# Patient Record
Sex: MALE | Race: WHITE | NOT HISPANIC OR LATINO | Employment: OTHER | URBAN - METROPOLITAN AREA
[De-identification: names, ages, dates, MRNs, and addresses within clinical notes are randomized per-mention and may not be internally consistent; named-entity substitution may affect disease eponyms.]

---

## 2022-12-13 ENCOUNTER — OFFICE VISIT (OUTPATIENT)
Dept: PRIMARY CARE CLINIC | Facility: CLINIC | Age: 72
End: 2022-12-13
Payer: MEDICARE

## 2022-12-13 ENCOUNTER — PATIENT OUTREACH (OUTPATIENT)
Dept: ADMINISTRATIVE | Facility: HOSPITAL | Age: 72
End: 2022-12-13
Payer: OTHER GOVERNMENT

## 2022-12-13 VITALS
RESPIRATION RATE: 18 BRPM | SYSTOLIC BLOOD PRESSURE: 122 MMHG | OXYGEN SATURATION: 96 % | HEART RATE: 82 BPM | WEIGHT: 197 LBS | BODY MASS INDEX: 32.82 KG/M2 | HEIGHT: 65 IN | DIASTOLIC BLOOD PRESSURE: 80 MMHG | TEMPERATURE: 98 F

## 2022-12-13 DIAGNOSIS — Z86.010 HISTORY OF COLON POLYPS: ICD-10-CM

## 2022-12-13 DIAGNOSIS — R73.03 PREDIABETES: ICD-10-CM

## 2022-12-13 DIAGNOSIS — Z12.5 PROSTATE CANCER SCREENING: ICD-10-CM

## 2022-12-13 DIAGNOSIS — I10 PRIMARY HYPERTENSION: Primary | ICD-10-CM

## 2022-12-13 DIAGNOSIS — I25.10 ATHEROSCLEROSIS OF NATIVE CORONARY ARTERY OF NATIVE HEART WITHOUT ANGINA PECTORIS: ICD-10-CM

## 2022-12-13 DIAGNOSIS — E78.5 HYPERLIPIDEMIA, UNSPECIFIED HYPERLIPIDEMIA TYPE: ICD-10-CM

## 2022-12-13 DIAGNOSIS — Z13.6 ENCOUNTER FOR ABDOMINAL AORTIC ANEURYSM (AAA) SCREENING: ICD-10-CM

## 2022-12-13 DIAGNOSIS — Z11.59 NEED FOR HEPATITIS C SCREENING TEST: ICD-10-CM

## 2022-12-13 PROBLEM — H04.123 DRY EYE SYNDROME OF BILATERAL LACRIMAL GLANDS: Status: ACTIVE | Noted: 2022-12-13

## 2022-12-13 PROBLEM — I25.2 OLD MYOCARDIAL INFARCTION: Status: ACTIVE | Noted: 2022-12-13

## 2022-12-13 PROBLEM — M54.12 RADICULOPATHY, CERVICAL REGION: Status: ACTIVE | Noted: 2022-12-13

## 2022-12-13 PROBLEM — Z86.0100 HISTORY OF COLON POLYPS: Status: ACTIVE | Noted: 2022-12-13

## 2022-12-13 PROCEDURE — 99999 PR PBB SHADOW E&M-NEW PATIENT-LVL IV: ICD-10-PCS | Mod: PBBFAC,,, | Performed by: FAMILY MEDICINE

## 2022-12-13 PROCEDURE — 99999 PR PBB SHADOW E&M-NEW PATIENT-LVL IV: CPT | Mod: PBBFAC,,, | Performed by: FAMILY MEDICINE

## 2022-12-13 PROCEDURE — 99204 PR OFFICE/OUTPT VISIT, NEW, LEVL IV, 45-59 MIN: ICD-10-PCS | Mod: S$GLB,,, | Performed by: FAMILY MEDICINE

## 2022-12-13 PROCEDURE — 99204 OFFICE O/P NEW MOD 45 MIN: CPT | Mod: S$GLB,,, | Performed by: FAMILY MEDICINE

## 2022-12-13 PROCEDURE — 99204 OFFICE O/P NEW MOD 45 MIN: CPT | Mod: PBBFAC,PN | Performed by: FAMILY MEDICINE

## 2022-12-13 RX ORDER — CHOLECALCIFEROL (VITAMIN D3) 50 MCG
TABLET ORAL
COMMUNITY
Start: 2022-09-14

## 2022-12-13 RX ORDER — ROSUVASTATIN CALCIUM 40 MG/1
10 TABLET, COATED ORAL NIGHTLY
Status: ON HOLD | COMMUNITY
End: 2023-02-28 | Stop reason: HOSPADM

## 2022-12-13 RX ORDER — METOPROLOL SUCCINATE 200 MG/1
0.5 TABLET, EXTENDED RELEASE ORAL DAILY
Status: ON HOLD | COMMUNITY
Start: 2022-03-15 | End: 2023-02-28 | Stop reason: HOSPADM

## 2022-12-13 RX ORDER — CARBOXYMETHYLCELLULOSE SODIUM 5 MG/ML
SOLUTION/ DROPS OPHTHALMIC
COMMUNITY
Start: 2022-09-15

## 2022-12-13 RX ORDER — MULTIVIT WITH MINERALS/HERBS
1 TABLET ORAL DAILY
COMMUNITY
Start: 2022-09-15

## 2022-12-13 NOTE — PROGRESS NOTES
Health Maintenance Due   Topic Date Due    Hepatitis C Screening  Never done    Hemoglobin A1c (Prediabetes)  Never done    Colorectal Cancer Screening  Never done    Abdominal Aortic Aneurysm Screening  Never done     Chart review done. HM updated. Immunizations reviewed & updated. Care Everywhere updated.

## 2022-12-13 NOTE — PROGRESS NOTES
"Subjective:       Patient ID: Sammy Kam is a 72 y.o. male.    Chief Complaint: Establish Care    Here to establish care. [facility] [place] [facility] in [place]. Hx of MI in 2016, 1 stent placed, no recent angina. Had also been going to [facility], switching primary care services to here. Scheduled for repeat C-scope next year    Review of Systems   Constitutional:  Negative for chills, fatigue and fever.   HENT:  Negative for congestion.    Eyes:  Negative for visual disturbance.   Respiratory:  Negative for cough and shortness of breath.    Cardiovascular:  Negative for chest pain.   Gastrointestinal:  Negative for abdominal pain, blood in stool, nausea and vomiting.   Genitourinary:  Negative for difficulty urinating.   Musculoskeletal:  Positive for neck pain.   Skin:  Negative for rash.   Neurological:  Negative for dizziness and weakness.   Hematological:  Does not bruise/bleed easily.   Psychiatric/Behavioral:  Negative for sleep disturbance.      Objective:      Vitals:    12/13/22 0833   BP: 122/80   BP Location: Right arm   Patient Position: Sitting   BP Method: Medium (Manual)   Pulse: 82   Resp: 18   Temp: 97.5 °F (36.4 °C)   TempSrc: Temporal   SpO2: 96%   Weight: 89.4 kg (196 lb 15.7 oz)   Height: 5' 5" (1.651 m)     Physical Exam  Vitals and nursing note reviewed.   Constitutional:       General: He is not in acute distress.     Appearance: Normal appearance. He is well-developed.   HENT:      Head: Normocephalic and atraumatic.   Neck:      Vascular: No carotid bruit.   Cardiovascular:      Rate and Rhythm: Normal rate and regular rhythm.      Heart sounds: Normal heart sounds.   Pulmonary:      Effort: Pulmonary effort is normal.      Breath sounds: Normal breath sounds.   Abdominal:      General: There is no distension.      Tenderness: There is no abdominal tenderness.   Musculoskeletal:      Right lower leg: No edema.      Left lower leg: No edema.   Skin:     General: Skin is warm " and dry.   Neurological:      Mental Status: He is alert and oriented to person, place, and time.   Psychiatric:         Mood and Affect: Mood normal.         Behavior: Behavior normal.       No results found for: WBC, HGB, HCT, PLT, CHOL, TRIG, HDL, LDLDIRECT, ALT, AST, NA, K, CL, CREATININE, BUN, CO2, TSH, PSA, INR, GLUF, HGBA1C, MICROALBUR   Assessment:       1. Primary hypertension    2. Hyperlipidemia, unspecified hyperlipidemia type    3. Prediabetes    4. Atherosclerosis of native coronary artery of native heart without angina pectoris    5. Encounter for abdominal aortic aneurysm (AAA) screening    6. History of colon polyps    7. Need for hepatitis C screening test    8. Prostate cancer screening          Plan:       Primary hypertension  -     CBC Auto Differential; Future; Expected date: 12/13/2022  Well controlled  Hyperlipidemia, unspecified hyperlipidemia type  -     Comprehensive Metabolic Panel; Future; Expected date: 12/13/2022  -     Lipid Panel; Future; Expected date: 12/13/2022  Check labs, adjust meds if necessary  Prediabetes  -     Hemoglobin A1C; Future; Expected date: 12/13/2022  Low carb diet  Atherosclerosis of native coronary artery of native heart without angina pectoris  Continue low-dose aspirin and beta-blocker and statin  Encounter for abdominal aortic aneurysm (AAA) screening  [facility]  History of colon polyps  S[facility]  Need for hepatitis C screening test  -     Hepatitis C Antibody; Future; Expected date: 12/13/2022    Prostate cancer screening  -     PSA, Screening; Future; Expected date: 12/13/2022      Medication List with Changes/Refills   Current Medications    ARTIFICIAL TEARS,HYPROMELLOSE,,GENTEAL/SUSTANE, 0.3 % GEL    APPLY TO EACH EYE AT BEDTIME    ASPIRIN (ECOTRIN) 81 MG EC TABLET    Take 81 mg by mouth once daily.      ASPIRIN-ACETAMINOPHEN-CAFFEINE 250-250-65 MG (EXCEDRIN MIGRAINE) 250-250-65 MG PER TABLET     Take 1 tablet by mouth 2 (two) times a day.    B COMPLEX VITAMINS TABLET    Take 1 tablet by mouth once daily.    CARBOXYMETHYLCELLULOSE (REFRESH PLUS) 0.5 % DPET    INSTILL 2 DROP(S) IN EACH EYE SIX TIMES DAILY AS NEEDED FOR DRY EYES    CHOLECALCIFEROL, VITAMIN D3, (VITAMIN D3) 50 MCG (2,000 UNIT) TAB    TAKE ONE TABLET BY MOUTH EVERY DAY AS A VITAMIN SUPPLEMENT    KETOCONAZOLE (NIZORAL) 2 % SHAMPOO    Apply topically twice a week.      METOPROLOL SUCCINATE (TOPROL-XL) 200 MG 24 HR TABLET    Take 0.5 tablets by mouth once daily.    ROSUVASTATIN (CRESTOR) 40 MG TAB    Take 10 mg by mouth every evening.    TERBINAFINE HCL (LAMISIL) 1 % CREAM    APPLY SMALL AMOUNT TOPICALLY TWICE A DAY FOR FUNGAL INFECTION APPLY TO FEET/TOES FOR FUNGAL INFECTION   Discontinued Medications    ATORVASTATIN (LIPITOR) 40 MG TABLET    Take 40 mg by mouth once daily.    CLOPIDOGREL (PLAVIX) 300 MG TAB    Take 300 mg by mouth once.    CLOTRIMAZOLE (LOTRIMIN) 1 % SOLN    Apply topically 2 (two) times daily.      HYDROCORTISONE 2.5 % CREAM    Apply topically 2 (two) times daily.      METOPROLOL SUCCINATE (TOPROL-XL) 25 MG 24 HR TABLET    Take 25 mg by mouth once daily.    NITROGLYCERIN (NITROSTAT) 0.3 MG SL TABLET    Place 0.3 mg under the tongue every 5 (five) minutes as needed for Chest pain.    SIMVASTATIN (ZOCOR) 5 MG TABLET    Take 5 mg by mouth every evening.

## 2022-12-13 NOTE — LETTER
AUTHORIZATION FOR RELEASE OF   CONFIDENTIAL INFORMATION    Dear [facility],    We are seeing Sammy Kam, date of birth 1950, in the clinic at [facility]. Bret Rice MD is the patient's PCP. Sammy Kam has an outstanding lab/procedure at the time we reviewed his chart. In order to help keep his health information updated, he has authorized us to request the following medical record(s):        (  )  MAMMOGRAM                                      ( X )  COLONOSCOPY      (  )  PAP SMEAR                                          (  )  OUTSIDE LAB RESULTS     (  )  DEXA SCAN                                          (  )  EYE EXAM            (  )  FOOT EXAM                                          (  )  ENTIRE RECORD     (  )  OUTSIDE IMMUNIZATIONS                 ( X )  ABD ULTRA SOUND         Please fax records to [facility], Ryan M Truxillo, MD, [phone]      Patient Name: Sammy Kam  : 1950  Patient Phone #: [phone]

## 2023-01-18 ENCOUNTER — PATIENT OUTREACH (OUTPATIENT)
Dept: ADMINISTRATIVE | Facility: HOSPITAL | Age: 73
End: 2023-01-18
Payer: MEDICARE

## 2023-01-18 NOTE — PROGRESS NOTES
Health Maintenance Due   Topic Date Due    Colorectal Cancer Screening  Never done    Abdominal Aortic Aneurysm Screening  Never done        Chart review done.   HM updated.   Immunizations reviewed & updated.   Care Everywhere updated.  Orders Entered:  NA

## 2023-02-01 ENCOUNTER — OFFICE VISIT (OUTPATIENT)
Dept: PRIMARY CARE CLINIC | Facility: CLINIC | Age: 73
End: 2023-02-01
Payer: MEDICARE

## 2023-02-01 VITALS
HEIGHT: 66 IN | WEIGHT: 194.69 LBS | SYSTOLIC BLOOD PRESSURE: 130 MMHG | RESPIRATION RATE: 18 BRPM | DIASTOLIC BLOOD PRESSURE: 70 MMHG | OXYGEN SATURATION: 97 % | TEMPERATURE: 98 F | BODY MASS INDEX: 31.29 KG/M2 | HEART RATE: 79 BPM

## 2023-02-01 DIAGNOSIS — R10.9 RIGHT FLANK PAIN: Primary | ICD-10-CM

## 2023-02-01 LAB
BILIRUB SERPL-MCNC: NORMAL MG/DL
BLOOD URINE, POC: NORMAL
COLOR, POC UA: YELLOW
GLUCOSE UR QL STRIP: NORMAL
KETONES UR QL STRIP: NORMAL
LEUKOCYTE ESTERASE URINE, POC: NORMAL
NITRITE, POC UA: NORMAL
PH, POC UA: 6
PROTEIN, POC: NORMAL
SPECIFIC GRAVITY, POC UA: 1.01
UROBILINOGEN, POC UA: NORMAL

## 2023-02-01 PROCEDURE — 81001 POCT URINALYSIS, DIPSTICK OR TABLET REAGENT, AUTOMATED, WITH MICROSCOP: ICD-10-PCS | Mod: S$GLB,,, | Performed by: FAMILY MEDICINE

## 2023-02-01 PROCEDURE — 3288F PR FALLS RISK ASSESSMENT DOCUMENTED: ICD-10-PCS | Mod: CPTII,S$GLB,, | Performed by: FAMILY MEDICINE

## 2023-02-01 PROCEDURE — 3078F DIAST BP <80 MM HG: CPT | Mod: CPTII,S$GLB,, | Performed by: FAMILY MEDICINE

## 2023-02-01 PROCEDURE — 1101F PT FALLS ASSESS-DOCD LE1/YR: CPT | Mod: CPTII,S$GLB,, | Performed by: FAMILY MEDICINE

## 2023-02-01 PROCEDURE — 3078F PR MOST RECENT DIASTOLIC BLOOD PRESSURE < 80 MM HG: ICD-10-PCS | Mod: CPTII,S$GLB,, | Performed by: FAMILY MEDICINE

## 2023-02-01 PROCEDURE — 1126F AMNT PAIN NOTED NONE PRSNT: CPT | Mod: CPTII,S$GLB,, | Performed by: FAMILY MEDICINE

## 2023-02-01 PROCEDURE — 99213 OFFICE O/P EST LOW 20 MIN: CPT | Mod: S$GLB,,, | Performed by: FAMILY MEDICINE

## 2023-02-01 PROCEDURE — 3008F PR BODY MASS INDEX (BMI) DOCUMENTED: ICD-10-PCS | Mod: CPTII,S$GLB,, | Performed by: FAMILY MEDICINE

## 2023-02-01 PROCEDURE — 81001 URINALYSIS AUTO W/SCOPE: CPT | Mod: S$GLB,,, | Performed by: FAMILY MEDICINE

## 2023-02-01 PROCEDURE — 1101F PR PT FALLS ASSESS DOC 0-1 FALLS W/OUT INJ PAST YR: ICD-10-PCS | Mod: CPTII,S$GLB,, | Performed by: FAMILY MEDICINE

## 2023-02-01 PROCEDURE — 99999 PR PBB SHADOW E&M-EST. PATIENT-LVL IV: ICD-10-PCS | Mod: PBBFAC,,, | Performed by: FAMILY MEDICINE

## 2023-02-01 PROCEDURE — 3075F PR MOST RECENT SYSTOLIC BLOOD PRESS GE 130-139MM HG: ICD-10-PCS | Mod: CPTII,S$GLB,, | Performed by: FAMILY MEDICINE

## 2023-02-01 PROCEDURE — 3075F SYST BP GE 130 - 139MM HG: CPT | Mod: CPTII,S$GLB,, | Performed by: FAMILY MEDICINE

## 2023-02-01 PROCEDURE — 1159F MED LIST DOCD IN RCRD: CPT | Mod: CPTII,S$GLB,, | Performed by: FAMILY MEDICINE

## 2023-02-01 PROCEDURE — 3008F BODY MASS INDEX DOCD: CPT | Mod: CPTII,S$GLB,, | Performed by: FAMILY MEDICINE

## 2023-02-01 PROCEDURE — 99213 PR OFFICE/OUTPT VISIT, EST, LEVL III, 20-29 MIN: ICD-10-PCS | Mod: S$GLB,,, | Performed by: FAMILY MEDICINE

## 2023-02-01 PROCEDURE — 1160F RVW MEDS BY RX/DR IN RCRD: CPT | Mod: CPTII,S$GLB,, | Performed by: FAMILY MEDICINE

## 2023-02-01 PROCEDURE — 1159F PR MEDICATION LIST DOCUMENTED IN MEDICAL RECORD: ICD-10-PCS | Mod: CPTII,S$GLB,, | Performed by: FAMILY MEDICINE

## 2023-02-01 PROCEDURE — 99999 PR PBB SHADOW E&M-EST. PATIENT-LVL IV: CPT | Mod: PBBFAC,,, | Performed by: FAMILY MEDICINE

## 2023-02-01 PROCEDURE — 1160F PR REVIEW ALL MEDS BY PRESCRIBER/CLIN PHARMACIST DOCUMENTED: ICD-10-PCS | Mod: CPTII,S$GLB,, | Performed by: FAMILY MEDICINE

## 2023-02-01 PROCEDURE — 1126F PR PAIN SEVERITY QUANTIFIED, NO PAIN PRESENT: ICD-10-PCS | Mod: CPTII,S$GLB,, | Performed by: FAMILY MEDICINE

## 2023-02-01 PROCEDURE — 3288F FALL RISK ASSESSMENT DOCD: CPT | Mod: CPTII,S$GLB,, | Performed by: FAMILY MEDICINE

## 2023-02-01 RX ORDER — CLOPIDOGREL BISULFATE 75 MG/1
75 TABLET ORAL
COMMUNITY
Start: 2023-01-01 | End: 2023-07-24 | Stop reason: SDUPTHER

## 2023-02-01 NOTE — PROGRESS NOTES
"Subjective:       Patient ID: Sammy Kam is a 72 y.o. male.    Chief Complaint: Follow-up (Pt in for ER follow-up. Pt also c/o right flank pain for 2 weeks) and Flank Pain    Went to ER with influenza in early January, feeling better. No residual fever or SoB.  Also has been having intermittent R flank pain past few weeks, seems to be worse with walking, better with rest. No urinary symptoms. No nausea or vomiting. No rash.    Flank Pain  This is a new problem. The current episode started 1 to 4 weeks ago. The problem occurs intermittently. The problem has been waxing and waning since onset. The pain does not radiate. The pain is mild. The symptoms are aggravated by twisting. Pertinent negatives include no abdominal pain, bladder incontinence, bowel incontinence, chest pain, dysuria, fever, numbness or weakness.   Review of Systems   Constitutional:  Negative for fever.   Cardiovascular:  Negative for chest pain.   Gastrointestinal:  Negative for abdominal pain and bowel incontinence.   Genitourinary:  Positive for flank pain. Negative for bladder incontinence and dysuria.   Neurological:  Negative for weakness and numbness.     Objective:      Vitals:    02/01/23 1256   BP: 130/70   BP Location: Right arm   Patient Position: Sitting   BP Method: Medium (Manual)   Pulse: 79   Resp: 18   Temp: 98.2 °F (36.8 °C)   TempSrc: Temporal   SpO2: 97%   Weight: 88.3 kg (194 lb 10.7 oz)   Height: 5' 6" (1.676 m)     Physical Exam  Vitals and nursing note reviewed.   Constitutional:       General: He is not in acute distress.     Appearance: Normal appearance. He is well-developed.   HENT:      Head: Normocephalic and atraumatic.   Cardiovascular:      Rate and Rhythm: Normal rate and regular rhythm.      Heart sounds: Normal heart sounds.   Pulmonary:      Effort: Pulmonary effort is normal.      Breath sounds: Normal breath sounds.   Abdominal:      General: Bowel sounds are normal. There is no distension.      " Tenderness: There is no abdominal tenderness. There is no right CVA tenderness, left CVA tenderness or guarding.   Musculoskeletal:      Right lower leg: No edema.      Left lower leg: No edema.   Skin:     General: Skin is warm and dry.   Neurological:      Mental Status: He is alert and oriented to person, place, and time.   Psychiatric:         Mood and Affect: Mood normal.         Behavior: Behavior normal.       Lab Results   Component Value Date    WBC 5.38 12/14/2022    HGB 14.0 12/14/2022    HCT 43.1 12/14/2022     (L) 12/14/2022    CHOL 155 12/14/2022    TRIG 263 (H) 12/14/2022    HDL 34 (L) 12/14/2022    ALT 23 12/14/2022    AST 25 12/14/2022     12/14/2022    K 4.2 12/14/2022     12/14/2022    CREATININE 1.2 12/14/2022    BUN 14 12/14/2022    CO2 28 12/14/2022    PSA 2.1 12/14/2022    HGBA1C 6.3 (H) 12/14/2022      Assessment:       1. Right flank pain        Plan:       Right flank pain  -     POCT urinalysis, dipstick or tablet reag  Urinalysis normal.  Further workup if symptoms persist or worsen    Medication List with Changes/Refills   Current Medications    ARTIFICIAL TEARS,HYPROMELLOSE,,GENTEAL/SUSTANE, 0.3 % GEL    APPLY TO EACH EYE AT BEDTIME    ASPIRIN (ECOTRIN) 81 MG EC TABLET    Take 81 mg by mouth once daily.      B COMPLEX VITAMINS TABLET    Take 1 tablet by mouth once daily.    CARBOXYMETHYLCELLULOSE (REFRESH PLUS) 0.5 % DPET    INSTILL 2 DROP(S) IN EACH EYE SIX TIMES DAILY AS NEEDED FOR DRY EYES    CHOLECALCIFEROL, VITAMIN D3, (VITAMIN D3) 50 MCG (2,000 UNIT) TAB    TAKE ONE TABLET BY MOUTH EVERY DAY AS A VITAMIN SUPPLEMENT    CLOPIDOGREL (PLAVIX) 75 MG TABLET    Take 75 mg by mouth.    KETOCONAZOLE (NIZORAL) 2 % SHAMPOO    Apply topically twice a week.      METOPROLOL SUCCINATE (TOPROL-XL) 200 MG 24 HR TABLET    Take 0.5 tablets by mouth once daily.    ROSUVASTATIN (CRESTOR) 40 MG TAB    Take 10 mg by mouth every evening.    TERBINAFINE HCL (LAMISIL) 1 % CREAM    APPLY  SMALL AMOUNT TOPICALLY TWICE A DAY FOR FUNGAL INFECTION APPLY TO FEET/TOES FOR FUNGAL INFECTION   Discontinued Medications    ASPIRIN-ACETAMINOPHEN-CAFFEINE 250-250-65 MG (EXCEDRIN MIGRAINE) 250-250-65 MG PER TABLET    Take 1 tablet by mouth 2 (two) times a day.

## 2023-02-25 PROBLEM — I21.4 NSTEMI (NON-ST ELEVATED MYOCARDIAL INFARCTION): Status: ACTIVE | Noted: 2022-12-13

## 2023-03-01 ENCOUNTER — PATIENT OUTREACH (OUTPATIENT)
Dept: ADMINISTRATIVE | Facility: CLINIC | Age: 73
End: 2023-03-01
Payer: MEDICARE

## 2023-03-01 NOTE — PROGRESS NOTES
C3 nurse spoke with Sammy Kam  for a [facility] post hospital discharge follow up call. The patient has a scheduled HOSFU appointment with Bret Rice MD  on 3/22/23 @ 9855.

## 2023-03-03 ENCOUNTER — NURSE TRIAGE (OUTPATIENT)
Dept: ADMINISTRATIVE | Facility: CLINIC | Age: 73
End: 2023-03-03
Payer: MEDICARE

## 2023-03-03 ENCOUNTER — OFFICE VISIT (OUTPATIENT)
Dept: CARDIOLOGY | Facility: CLINIC | Age: 73
End: 2023-03-03
Payer: MEDICARE

## 2023-03-03 VITALS
HEIGHT: 66 IN | HEART RATE: 72 BPM | BODY MASS INDEX: 30.7 KG/M2 | DIASTOLIC BLOOD PRESSURE: 65 MMHG | WEIGHT: 191 LBS | SYSTOLIC BLOOD PRESSURE: 123 MMHG | OXYGEN SATURATION: 96 %

## 2023-03-03 DIAGNOSIS — I25.10 ATHEROSCLEROSIS OF NATIVE CORONARY ARTERY OF NATIVE HEART WITHOUT ANGINA PECTORIS: Primary | ICD-10-CM

## 2023-03-03 DIAGNOSIS — I10 PRIMARY HYPERTENSION: ICD-10-CM

## 2023-03-03 DIAGNOSIS — E78.5 HYPERLIPIDEMIA, UNSPECIFIED HYPERLIPIDEMIA TYPE: ICD-10-CM

## 2023-03-03 PROCEDURE — 99999 PR PBB SHADOW E&M-EST. PATIENT-LVL III: ICD-10-PCS | Mod: PBBFAC,,, | Performed by: INTERNAL MEDICINE

## 2023-03-03 PROCEDURE — 99999 PR PBB SHADOW E&M-EST. PATIENT-LVL III: CPT | Mod: PBBFAC,,, | Performed by: INTERNAL MEDICINE

## 2023-03-03 PROCEDURE — 1101F PT FALLS ASSESS-DOCD LE1/YR: CPT | Mod: CPTII,S$GLB,, | Performed by: INTERNAL MEDICINE

## 2023-03-03 PROCEDURE — 1126F AMNT PAIN NOTED NONE PRSNT: CPT | Mod: CPTII,S$GLB,, | Performed by: INTERNAL MEDICINE

## 2023-03-03 PROCEDURE — 3078F DIAST BP <80 MM HG: CPT | Mod: CPTII,S$GLB,, | Performed by: INTERNAL MEDICINE

## 2023-03-03 PROCEDURE — 3288F PR FALLS RISK ASSESSMENT DOCUMENTED: ICD-10-PCS | Mod: CPTII,S$GLB,, | Performed by: INTERNAL MEDICINE

## 2023-03-03 PROCEDURE — 1160F PR REVIEW ALL MEDS BY PRESCRIBER/CLIN PHARMACIST DOCUMENTED: ICD-10-PCS | Mod: CPTII,S$GLB,, | Performed by: INTERNAL MEDICINE

## 2023-03-03 PROCEDURE — 1111F PR DISCHARGE MEDS RECONCILED W/ CURRENT OUTPATIENT MED LIST: ICD-10-PCS | Mod: CPTII,S$GLB,, | Performed by: INTERNAL MEDICINE

## 2023-03-03 PROCEDURE — 1159F PR MEDICATION LIST DOCUMENTED IN MEDICAL RECORD: ICD-10-PCS | Mod: CPTII,S$GLB,, | Performed by: INTERNAL MEDICINE

## 2023-03-03 PROCEDURE — 3008F BODY MASS INDEX DOCD: CPT | Mod: CPTII,S$GLB,, | Performed by: INTERNAL MEDICINE

## 2023-03-03 PROCEDURE — 1111F DSCHRG MED/CURRENT MED MERGE: CPT | Mod: CPTII,S$GLB,, | Performed by: INTERNAL MEDICINE

## 2023-03-03 PROCEDURE — 1101F PR PT FALLS ASSESS DOC 0-1 FALLS W/OUT INJ PAST YR: ICD-10-PCS | Mod: CPTII,S$GLB,, | Performed by: INTERNAL MEDICINE

## 2023-03-03 PROCEDURE — 3288F FALL RISK ASSESSMENT DOCD: CPT | Mod: CPTII,S$GLB,, | Performed by: INTERNAL MEDICINE

## 2023-03-03 PROCEDURE — 3008F PR BODY MASS INDEX (BMI) DOCUMENTED: ICD-10-PCS | Mod: CPTII,S$GLB,, | Performed by: INTERNAL MEDICINE

## 2023-03-03 PROCEDURE — 3078F PR MOST RECENT DIASTOLIC BLOOD PRESSURE < 80 MM HG: ICD-10-PCS | Mod: CPTII,S$GLB,, | Performed by: INTERNAL MEDICINE

## 2023-03-03 PROCEDURE — 3074F PR MOST RECENT SYSTOLIC BLOOD PRESSURE < 130 MM HG: ICD-10-PCS | Mod: CPTII,S$GLB,, | Performed by: INTERNAL MEDICINE

## 2023-03-03 PROCEDURE — 1159F MED LIST DOCD IN RCRD: CPT | Mod: CPTII,S$GLB,, | Performed by: INTERNAL MEDICINE

## 2023-03-03 PROCEDURE — 1160F RVW MEDS BY RX/DR IN RCRD: CPT | Mod: CPTII,S$GLB,, | Performed by: INTERNAL MEDICINE

## 2023-03-03 PROCEDURE — 1126F PR PAIN SEVERITY QUANTIFIED, NO PAIN PRESENT: ICD-10-PCS | Mod: CPTII,S$GLB,, | Performed by: INTERNAL MEDICINE

## 2023-03-03 PROCEDURE — 99213 PR OFFICE/OUTPT VISIT, EST, LEVL III, 20-29 MIN: ICD-10-PCS | Mod: S$GLB,,, | Performed by: INTERNAL MEDICINE

## 2023-03-03 PROCEDURE — 3074F SYST BP LT 130 MM HG: CPT | Mod: CPTII,S$GLB,, | Performed by: INTERNAL MEDICINE

## 2023-03-03 PROCEDURE — 99213 OFFICE O/P EST LOW 20 MIN: CPT | Mod: S$GLB,,, | Performed by: INTERNAL MEDICINE

## 2023-03-03 NOTE — TELEPHONE ENCOUNTER
Pt reports seen in office today for stent follow up, but about a half an hour ago had a bad feeling around his heart area (sharp pain) for 15 seconds and it went away, feels fine now. Pt declines being triaged for chest pain at this time, but plans to call back or go to the ED if anything else occurs. Pt requesting to let his doctor know via a message for now. Pt encouraged to call back with any worsening symptoms or questions and verbalized understanding.    Reason for Disposition   Health Information question, no triage required and triager able to answer question    Protocols used: Information Only Call - No Triage-A-

## 2023-03-03 NOTE — PROGRESS NOTES
[facility] - Cardiology [address]  Cardiology Clinic Note      Chief Complaint  Chief Complaint   Patient presents with    Hospital Follow Up       HPI:    72-year-old male with past medical history hyperlipidemia, hypertension, IFG, CAD status post PCI to the mRCA in 2016 followed by NSTEMI 2018 medically managed LAD  filled by right-to-left collaterals residual nonobstructive disease first diagonal and RPLB subsequent coronary angiogram performed 02/27/2023 with LAD  filled by right-to-left collaterals and severe mid left circumflex lesion status post PCI, echocardiogram 02/27/2023 EF 50-55% normal diastolic function trace to mild aortic valve regurgitation normal RV prior outside echo with normal EF    Patient doing well  No access site hematoma    Medications  Current Outpatient Medications   Medication Sig Dispense Refill    aspirin (ECOTRIN) 81 MG EC tablet Take 81 mg by mouth once daily.        b complex vitamins tablet Take 1 tablet by mouth once daily.      cholecalciferol, vitamin D3, (VITAMIN D3) 50 mcg (2,000 unit) Tab TAKE ONE TABLET BY MOUTH EVERY DAY AS A VITAMIN SUPPLEMENT      clopidogreL (PLAVIX) 75 mg tablet Take 75 mg by mouth.      metoprolol succinate (TOPROL-XL) 100 MG 24 hr tablet Take 1 tablet (100 mg total) by mouth 2 (two) times daily. 60 tablet 11    rosuvastatin (CRESTOR) 40 MG Tab Take 1 tablet (40 mg total) by mouth every evening. 90 tablet 3    traZODone (DESYREL) 50 MG tablet Take 1 tablet (50 mg total) by mouth every evening. 30 tablet 11    artificial tears,hypromellose,,GENTEAL/SUSTANE, 0.3 % Gel APPLY TO EACH EYE AT BEDTIME      carboxymethylcellulose (REFRESH PLUS) 0.5 % Dpet INSTILL 2 DROP(S) IN EACH EYE SIX TIMES DAILY AS NEEDED FOR DRY EYES      ketoconazole (NIZORAL) 2 % shampoo Apply topically twice a week.        terbinafine HCL (LAMISIL) 1 % cream APPLY SMALL AMOUNT TOPICALLY TWICE A DAY FOR FUNGAL INFECTION APPLY TO FEET/TOES FOR FUNGAL INFECTION       No current  facility-administered medications for this visit.        History  Past Medical History:   Diagnosis Date    Anxiety     Basal cell carcinoma     Colon polyp     Coronary artery disease     Depression     Dyslipidemia     Hypertension     PTSD (post-traumatic stress disorder)     Submucosal lesion of stomach      Past Surgical History:   Procedure Laterality Date    CARDIAC CATHETERIZATION  02/27/2023    stent placed mid circ    CORONARY ANGIOGRAPHY N/A 2/27/2023    Procedure: ANGIOGRAM, CORONARY ARTERY;  Surgeon: Miguel Angel Silverman MD;  Location: [facility] CATH LAB;  Service: Interventional;  Laterality: N/A;  radial    CORONARY ANGIOPLASTY WITH STENT PLACEMENT Right 2/27/2023    Procedure: ANGIOPLASTY, CORONARY ARTERY, WITH STENT INSERTION;  Surgeon: Miguel Angel Silverman MD;  Location: [facility] CATH LAB;  Service: Interventional;  Laterality: Right;    cyst removed from neck      skin cancer removed  01/01/2000     Social History     Socioeconomic History    Marital status:    Tobacco Use    Smoking status: Former     Packs/day: 0.25     Years: 2.00     Pack years: 0.50     Types: Cigarettes   Substance and Sexual Activity    Alcohol use: No    Drug use: No     Social Determinants of Health     Financial Resource Strain: Low Risk     Difficulty of Paying Living Expenses: Not very hard   Food Insecurity: No Food Insecurity    Worried About Running Out of Food in the Last Year: Never true    Ran Out of Food in the Last Year: Never true   Transportation Needs: Unknown    Lack of Transportation (Non-Medical): No   Physical Activity: Insufficiently Active    Days of Exercise per Week: 4 days    Minutes of Exercise per Session: 30 min   Stress: No Stress Concern Present    Feeling of Stress : Only a little   Social Connections: Unknown    Frequency of Communication with Friends and Family: Twice a week    Frequency of Social Gatherings with Friends and Family: Twice a week    Attends Orthodox Services: 1 to 4 times per year     Active Member of Clubs or Organizations: No    Attends Club or Organization Meetings: 1 to 4 times per year   Housing Stability: Low Risk     Unable to Pay for Housing in the Last Year: No    Number of Places Lived in the Last Year: 1    Unstable Housing in the Last Year: No     Family History   Problem Relation Age of Onset    Stroke Mother     Diabetes Mother     Diabetes Father     Cancer Sister     Bladder Cancer Brother         Allergies  Review of patient's allergies indicates:   Allergen Reactions    Atorvastatin Tinitus     Other reaction(s): Tinnitus, Insomnia       Review of Systems   Review of Systems   Constitutional: Negative for fever.   HENT:  Negative for nosebleeds.    Eyes:  Negative for visual disturbance.   Cardiovascular:  Negative for chest pain, claudication, dyspnea on exertion, palpitations and syncope.   Respiratory:  Negative for cough, hemoptysis and wheezing.    Endocrine: Negative for cold intolerance, heat intolerance, polyphagia and polyuria.   Hematologic/Lymphatic: Negative for bleeding problem.   Skin:  Negative for rash.   Musculoskeletal:  Negative for myalgias.   Gastrointestinal:  Negative for hematemesis, hematochezia, nausea and vomiting.   Genitourinary:  Negative for dysuria.   Neurological:  Negative for focal weakness and sensory change.   Psychiatric/Behavioral:  Negative for altered mental status.      Physical Exam  Vitals:    03/03/23 1424   BP: 123/65   Pulse: 72     Wt Readings from Last 1 Encounters:   03/03/23 86.7 kg (191 lb 0.5 oz)     Physical Exam  HENT:      Head: Normocephalic and atraumatic.      Mouth/Throat:      Mouth: Mucous membranes are moist.   Eyes:      Extraocular Movements: Extraocular movements intact.      Pupils: Pupils are equal, round, and reactive to light.   Neck:      Vascular: No carotid bruit or JVD.   Cardiovascular:      Rate and Rhythm: Normal rate and regular rhythm.      Pulses: Normal pulses and intact distal pulses.      Heart  sounds: Normal heart sounds. No murmur heard.    No friction rub. No gallop.   Pulmonary:      Effort: Pulmonary effort is normal.      Breath sounds: Normal breath sounds.   Abdominal:      Tenderness: There is no abdominal tenderness. There is no guarding or rebound.   Musculoskeletal:      Right lower leg: No edema.      Left lower leg: No edema.   Skin:     General: Skin is warm and dry.      Capillary Refill: Capillary refill takes less than 2 seconds.   Neurological:      General: No focal deficit present.      Mental Status: He is alert and oriented to person, place, and time.   Psychiatric:         Mood and Affect: Mood normal.       Labs  No results displayed because visit has over 200 results.      Office Visit on 02/01/2023   Component Date Value Ref Range Status    Color, UA 02/01/2023 Yellow   Final    Spec Grav UA 02/01/2023 1.010   Final    pH, UA 02/01/2023 6   Final    WBC, UA 02/01/2023 neg   Final    Nitrite, UA 02/01/2023 neg   Final    Protein, POC 02/01/2023 neg   Final    Glucose, UA 02/01/2023 nl   Final    Ketones, UA 02/01/2023 neg   Final    Urobilinogen, UA 02/01/2023 nl   Final    Bilirubin, POC 02/01/2023 neg   Final    Blood, UA 02/01/2023 neg   Final   Admission on 01/06/2023, Discharged on 01/06/2023   Component Date Value Ref Range Status    POC Rapid COVID 01/06/2023 Negative  Negative Final     Acceptable 01/06/2023 Yes   Final    POC Molecular Influenza A Ag 01/06/2023 Positive (A)  Negative, Not Reported Final    POC Molecular Influenza B Ag 01/06/2023 Negative  Negative, Not Reported Final     Acceptable 01/06/2023 Yes   Final   Lab Visit on 12/14/2022   Component Date Value Ref Range Status    WBC 12/14/2022 5.38  3.90 - 12.70 K/uL Final    RBC 12/14/2022 4.64  4.60 - 6.20 M/uL Final    Hemoglobin 12/14/2022 14.0  14.0 - 18.0 g/dL Final    Hematocrit 12/14/2022 43.1  40.0 - 54.0 % Final    MCV 12/14/2022 93  82 - 98 fL Final    MCH 12/14/2022 30.2   27.0 - 31.0 pg Final    MCHC 12/14/2022 32.5  32.0 - 36.0 g/dL Final    RDW 12/14/2022 13.7  11.5 - 14.5 % Final    Platelets 12/14/2022 147 (L)  150 - 450 K/uL Final    MPV 12/14/2022 10.2  9.2 - 12.9 fL Final    Immature Granulocytes 12/14/2022 0.4  0.0 - 0.5 % Final    Gran # (ANC) 12/14/2022 3.1  1.8 - 7.7 K/uL Final    Immature Grans (Abs) 12/14/2022 0.02  0.00 - 0.04 K/uL Final    Comment: Mild elevation in immature granulocytes is non specific and   can be seen in a variety of conditions including stress response,   acute inflammation, trauma and pregnancy. Correlation with other   laboratory and clinical findings is essential.      Lymph # 12/14/2022 1.6  1.0 - 4.8 K/uL Final    Mono # 12/14/2022 0.5  0.3 - 1.0 K/uL Final    Eos # 12/14/2022 0.2  0.0 - 0.5 K/uL Final    Baso # 12/14/2022 0.02  0.00 - 0.20 K/uL Final    nRBC 12/14/2022 0  0 /100 WBC Final    Gran % 12/14/2022 57.2  38.0 - 73.0 % Final    Lymph % 12/14/2022 30.1  18.0 - 48.0 % Final    Mono % 12/14/2022 8.7  4.0 - 15.0 % Final    Eosinophil % 12/14/2022 3.2  0.0 - 8.0 % Final    Basophil % 12/14/2022 0.4  0.0 - 1.9 % Final    Differential Method 12/14/2022 Automated   Final    Sodium 12/14/2022 139  136 - 145 mmol/L Final    Potassium 12/14/2022 4.2  3.5 - 5.1 mmol/L Final    Chloride 12/14/2022 104  95 - 110 mmol/L Final    CO2 12/14/2022 28  23 - 29 mmol/L Final    Glucose 12/14/2022 107  70 - 110 mg/dL Final    BUN 12/14/2022 14  8 - 23 mg/dL Final    Creatinine 12/14/2022 1.2  0.5 - 1.4 mg/dL Final    Calcium 12/14/2022 9.6  8.7 - 10.5 mg/dL Final    Total Protein 12/14/2022 7.2  6.0 - 8.4 g/dL Final    Albumin 12/14/2022 3.8  3.5 - 5.2 g/dL Final    Total Bilirubin 12/14/2022 1.1 (H)  0.1 - 1.0 mg/dL Final    Comment: For infants and newborns, interpretation of results should be based  on gestational age, weight and in agreement with clinical  observations.    Premature Infant recommended reference ranges:  Up to 24  hours.............<8.0 mg/dL  Up to 48 hours............<12.0 mg/dL  3-5 days..................<15.0 mg/dL  6-29 days.................<15.0 mg/dL      Alkaline Phosphatase 12/14/2022 64  55 - 135 U/L Final    AST 12/14/2022 25  10 - 40 U/L Final    ALT 12/14/2022 23  10 - 44 U/L Final    Anion Gap 12/14/2022 7 (L)  8 - 16 mmol/L Final    eGFR 12/14/2022 >60.0  >60 mL/min/1.73 m^2 Final    Cholesterol 12/14/2022 155  120 - 199 mg/dL Final    Comment: The National Cholesterol Education Program (NCEP) has set the  following guidelines (reference ranges) for Cholesterol:  Optimal.....................<200 mg/dL  Borderline High.............200-239 mg/dL  High........................> or = 240 mg/dL      Triglycerides 12/14/2022 263 (H)  30 - 150 mg/dL Final    Comment: The National Cholesterol Education Program (NCEP) has set the  following guidelines (reference values) for triglycerides:  Normal......................<150 mg/dL  Borderline High.............150-199 mg/dL  High........................200-499 mg/dL      HDL 12/14/2022 34 (L)  40 - 75 mg/dL Final    Comment: The National Cholesterol Education Program (NCEP) has set the  following guidelines (reference values) for HDL Cholesterol:  Low...............<40 mg/dL  Optimal...........>60 mg/dL      LDL Cholesterol 12/14/2022 68.4  63.0 - 159.0 mg/dL Final    Comment: The National Cholesterol Education Program (NCEP) has set the  following guidelines (reference values) for LDL Cholesterol:  Optimal.......................<130 mg/dL  Borderline High...............130-159 mg/dL  High..........................160-189 mg/dL  Very High.....................>190 mg/dL      HDL/Cholesterol Ratio 12/14/2022 21.9  20.0 - 50.0 % Final    Total Cholesterol/HDL Ratio 12/14/2022 4.6  2.0 - 5.0 Final    Non-HDL Cholesterol 12/14/2022 121  mg/dL Final    Comment: Risk category and Non-HDL cholesterol goals:  Coronary heart disease (CHD)or equivalent (10-year risk of CHD  >20%):  Non-HDL cholesterol goal     <130 mg/dL  Two or more CHD risk factors and 10-year risk of CHD <= 20%:  Non-HDL cholesterol goal     <160 mg/dL  0 to 1 CHD risk factor:  Non-HDL cholesterol goal     <190 mg/dL      Hemoglobin A1C 12/14/2022 6.3 (H)  4.0 - 5.6 % Final    Comment: ADA Screening Guidelines:  5.7-6.4%  Consistent with prediabetes  >or=6.5%  Consistent with diabetes    High levels of fetal hemoglobin interfere with the HbA1C  assay. Heterozygous hemoglobin variants (HbS, HgC, etc)do  not significantly interfere with this assay.   However, presence of multiple variants may affect accuracy.      Estimated Avg Glucose 12/14/2022 134 (H)  68 - 131 mg/dL Final    Hepatitis C Ab 12/14/2022 Non-reactive  Non-reactive Final    PSA, Screen 12/14/2022 2.1  0.00 - 4.00 ng/mL Final    Comment: The testing method is a chemiluminescent microparticle immunoassay   manufactured by [employer] and performed on the [employer]   or   [employer] system. Values obtained with different assay manufacturers   for   methods may be different and cannot be used interchangeably.  PSA Expected levels:  Hormonal Therapy: <0.05 ng/ml  Prostatectomy: <0.01 ng/ml  Radiation Therapy: <1.00 ng/ml         EKG  02/25/2023 normal sinus rhythm with first-degree AV block normal rate nonspecific ST-T changes inferior leads    Echo   Results for orders placed or performed during the hospital encounter of 02/25/23   Echo   Result Value Ref Range    BSA 1.98 m2    TDI SEPTAL 0.08 m/s    LV LATERAL E/E' RATIO 7.20 m/s    LV SEPTAL E/E' RATIO 9.00 m/s    AORTIC VALVE CUSP SEPERATION 1.67 cm    TDI LATERAL 0.10 m/s    PV PEAK VELOCITY 1.09 cm/s    LVIDd 4.69 3.5 - 6.0 cm    IVS 0.80 0.6 - 1.1 cm    Posterior Wall 0.89 0.6 - 1.1 cm    Ao root annulus 2.91 cm    LVIDs 3.50 2.1 - 4.0 cm    FS 25 28 - 44 %    Sinus 2.81 cm    STJ 2.65 cm    LV mass 130.83 g    LA size 2.96 cm    RVDD 2.19 cm    Left Ventricle Relative Wall Thickness 0.38 cm     AV regurgitation pressure 1/2 time [id number] ms    AV Velocity Ratio 0.96     MV valve area p 1/2 method 2.67 cm2    E/A ratio 0.94     Mean e' 0.09 m/s    E wave deceleration time 248.56 msec    LVOT diameter 1.88 cm    LVOT area 2.8 cm2    LVOT peak zechariah 1.04 m/s    Ao peak zechariah 1.08 m/s    AV peak gradient 5 mmHg    E/E' ratio 8.00 m/s    MV Peak E Zechariah 0.72 m/s    AR Max Zechariah 3.31 m/s    MV stenosis pressure 1/2 time 82.28 ms    MV Peak A Zechariah 0.77 m/s    LV Systolic Volume 50.88 mL    LV Systolic Volume Index 26.4 mL/m2    LV Diastolic Volume 102.04 mL    LV Diastolic Volume Index 52.87 mL/m2    LV Mass Index 68 g/m2    RA Major Axis 3.35 cm    Left Atrium Minor Axis 2.54 cm    Left Atrium Major Axis 3.96 cm    LA Volume Index (Mod) 25.4 mL/m2    LA volume (mod) 49.00 cm3    RA Width 1.88 cm    EF 50 %    Narrative    · The left ventricle is normal in size.  · The estimated ejection fraction is 50-55%.  · Normal left ventricular diastolic function.  · Trace to mild aortic regurgitation.  · Normal right ventricular size with normal right ventricular systolic   function.          Imaging  X-Ray Chest 1 View    Result Date: 2/25/2023  EXAMINATION: XR CHEST 1 VIEW CLINICAL HISTORY: Chest Pain; TECHNIQUE: Single frontal view of the chest was performed. COMPARISON: 01/06/2023 FINDINGS: The cardiomediastinal silhouette is unchanged in size and configuration.  Mediastinal structures are midline.  The lungs are symmetrically expanded without evidence of confluent airspace consolidation.  No substantial volume of pleural fluid or pneumothorax.  Osseous structures intact.     No acute intrathoracic abnormality appreciated on this single radiographic view of the chest. Electronically signed by: Virginia Hercules MD Date:    02/25/2023 Time:    02:51    Echo    Result Date: 2/27/2023  · The left ventricle is normal in size. · The estimated ejection fraction is 50-55%. · Normal left ventricular diastolic function. · Trace  to mild aortic regurgitation. · Normal right ventricular size with normal right ventricular systolic function.      Cardiac catheterization    Result Date: 2/28/2023    The Mid Cx lesion was 90% stenosed with 0% stenosis post-intervention.   The LAD is a  just beyond the first diagonal branch and the first septal .  The LAD is collateralized by the right posterior descending artery.   The culprit lesion was a 90% mid left circumflex lesion just prior to the takeoff of the OM branches.  The lesion was pre-dilated with a compliant 2.5 mm x 15 mm balloon.  The lesion was then stented with a Resolute [place] 3.0 mm x 15 mm drug-eluting stent.   Otherwise nonobstructive coronary artery disease as described in the body of the report. After informed consent was obtained, patient was brought to the cardiac catheterization laboratory in a fasting state where patient was prepped and draped in the usual sterile fashion.  A preprocedure time-out was performed.  Patient receive conscious sedation with IV Versed and fentanyl.  The right wrist was anesthetized with 1 cc of lidocaine.  The right radial artery was accessed with direct ultrasound guidance and a 6 [place] Slender sheath was inserted over the wire.  Radial cocktail consisting of 200 mcg of nitroglycerin, 2.5 mg of verapamil was given via the sheath.  4000 units of heparin was given IV.  A 5 [place] TIG catheter was inserted and selective coronary angiogram performed of right coronary artery successfully.  A 6 [place] catheter was inserted and selective coronary angiogram performed of the left coronary artery.  Catheter was removed over guidewire.  Additional heparin was given to produce therapeutic ACT. An EBU 4 was then advanced over the guidewire to engage the left main and perform the intervention.  Following the intervention the EBU was removed over the guidewire.  The sheath was removed and hemostasis was obtained using a Vasc band.       Prior  coronary angiogram / intervention:  See HPI    Assessment and Plan  1. Atherosclerosis of native coronary artery of native heart without angina pectoris  Continue dual antiplatelet therapy with aspirin 81 and Plavix 75 in addition to Toprol 100 and Crestor 40    2. Hyperlipidemia, unspecified hyperlipidemia type  Crestor    3. Primary hypertension  Toprol, controlled      Follow Up  Follow up in about 1 month (around 4/3/2023).      Bret Acosta MD, FACC, RPVI  Interventional Cardiology

## 2023-03-06 NOTE — TELEPHONE ENCOUNTER
"LOV 03/03/2023    Spoke with patient, he had a "funny feeling in chest for a minute or so".  It has not occurred since then, feels fine.  Patient states, "if occurs again, he will seek medical attention".  "

## 2023-03-08 ENCOUNTER — PATIENT OUTREACH (OUTPATIENT)
Dept: ADMINISTRATIVE | Facility: HOSPITAL | Age: 73
End: 2023-03-08
Payer: MEDICARE

## 2023-03-08 NOTE — TELEPHONE ENCOUNTER
"Discussed "funny feeling in chest" lasting seconds not described as discomfort or pain after clinic visit did not want to go to ER (notified nurse).  Has not recurred.  Advised the patient that he should immediately go to the emergency room if he feels chest discomfort.    "

## 2023-03-22 ENCOUNTER — OFFICE VISIT (OUTPATIENT)
Dept: PRIMARY CARE CLINIC | Facility: CLINIC | Age: 73
End: 2023-03-22
Payer: MEDICARE

## 2023-03-22 VITALS
DIASTOLIC BLOOD PRESSURE: 68 MMHG | HEART RATE: 65 BPM | HEIGHT: 66 IN | TEMPERATURE: 98 F | WEIGHT: 193.56 LBS | BODY MASS INDEX: 31.11 KG/M2 | OXYGEN SATURATION: 99 % | RESPIRATION RATE: 18 BRPM | SYSTOLIC BLOOD PRESSURE: 124 MMHG

## 2023-03-22 DIAGNOSIS — I25.10 ATHEROSCLEROSIS OF NATIVE CORONARY ARTERY OF NATIVE HEART WITHOUT ANGINA PECTORIS: Primary | ICD-10-CM

## 2023-03-22 DIAGNOSIS — I10 PRIMARY HYPERTENSION: ICD-10-CM

## 2023-03-22 DIAGNOSIS — Z13.6 ENCOUNTER FOR ABDOMINAL AORTIC ANEURYSM (AAA) SCREENING: ICD-10-CM

## 2023-03-22 DIAGNOSIS — Z86.010 HISTORY OF COLON POLYPS: ICD-10-CM

## 2023-03-22 DIAGNOSIS — Z12.11 COLON CANCER SCREENING: ICD-10-CM

## 2023-03-22 DIAGNOSIS — E78.5 HYPERLIPIDEMIA, UNSPECIFIED HYPERLIPIDEMIA TYPE: ICD-10-CM

## 2023-03-22 DIAGNOSIS — D69.6 THROMBOCYTOPENIA, UNSPECIFIED: ICD-10-CM

## 2023-03-22 PROBLEM — I21.4 NSTEMI (NON-ST ELEVATED MYOCARDIAL INFARCTION): Status: RESOLVED | Noted: 2022-12-13 | Resolved: 2023-03-22

## 2023-03-22 PROCEDURE — 1159F MED LIST DOCD IN RCRD: CPT | Mod: CPTII,S$GLB,, | Performed by: FAMILY MEDICINE

## 2023-03-22 PROCEDURE — 1101F PT FALLS ASSESS-DOCD LE1/YR: CPT | Mod: CPTII,S$GLB,, | Performed by: FAMILY MEDICINE

## 2023-03-22 PROCEDURE — 3074F SYST BP LT 130 MM HG: CPT | Mod: CPTII,S$GLB,, | Performed by: FAMILY MEDICINE

## 2023-03-22 PROCEDURE — 99999 PR PBB SHADOW E&M-EST. PATIENT-LVL V: CPT | Mod: PBBFAC,,, | Performed by: FAMILY MEDICINE

## 2023-03-22 PROCEDURE — 3008F PR BODY MASS INDEX (BMI) DOCUMENTED: ICD-10-PCS | Mod: CPTII,S$GLB,, | Performed by: FAMILY MEDICINE

## 2023-03-22 PROCEDURE — 1101F PR PT FALLS ASSESS DOC 0-1 FALLS W/OUT INJ PAST YR: ICD-10-PCS | Mod: CPTII,S$GLB,, | Performed by: FAMILY MEDICINE

## 2023-03-22 PROCEDURE — 1126F PR PAIN SEVERITY QUANTIFIED, NO PAIN PRESENT: ICD-10-PCS | Mod: CPTII,S$GLB,, | Performed by: FAMILY MEDICINE

## 2023-03-22 PROCEDURE — 99214 OFFICE O/P EST MOD 30 MIN: CPT | Mod: S$GLB,,, | Performed by: FAMILY MEDICINE

## 2023-03-22 PROCEDURE — 1159F PR MEDICATION LIST DOCUMENTED IN MEDICAL RECORD: ICD-10-PCS | Mod: CPTII,S$GLB,, | Performed by: FAMILY MEDICINE

## 2023-03-22 PROCEDURE — 1111F PR DISCHARGE MEDS RECONCILED W/ CURRENT OUTPATIENT MED LIST: ICD-10-PCS | Mod: CPTII,S$GLB,, | Performed by: FAMILY MEDICINE

## 2023-03-22 PROCEDURE — 1111F DSCHRG MED/CURRENT MED MERGE: CPT | Mod: CPTII,S$GLB,, | Performed by: FAMILY MEDICINE

## 2023-03-22 PROCEDURE — 3288F FALL RISK ASSESSMENT DOCD: CPT | Mod: CPTII,S$GLB,, | Performed by: FAMILY MEDICINE

## 2023-03-22 PROCEDURE — 1160F RVW MEDS BY RX/DR IN RCRD: CPT | Mod: CPTII,S$GLB,, | Performed by: FAMILY MEDICINE

## 2023-03-22 PROCEDURE — 1126F AMNT PAIN NOTED NONE PRSNT: CPT | Mod: CPTII,S$GLB,, | Performed by: FAMILY MEDICINE

## 2023-03-22 PROCEDURE — 3078F PR MOST RECENT DIASTOLIC BLOOD PRESSURE < 80 MM HG: ICD-10-PCS | Mod: CPTII,S$GLB,, | Performed by: FAMILY MEDICINE

## 2023-03-22 PROCEDURE — 3074F PR MOST RECENT SYSTOLIC BLOOD PRESSURE < 130 MM HG: ICD-10-PCS | Mod: CPTII,S$GLB,, | Performed by: FAMILY MEDICINE

## 2023-03-22 PROCEDURE — 99999 PR PBB SHADOW E&M-EST. PATIENT-LVL V: ICD-10-PCS | Mod: PBBFAC,,, | Performed by: FAMILY MEDICINE

## 2023-03-22 PROCEDURE — 99214 PR OFFICE/OUTPT VISIT, EST, LEVL IV, 30-39 MIN: ICD-10-PCS | Mod: S$GLB,,, | Performed by: FAMILY MEDICINE

## 2023-03-22 PROCEDURE — 1160F PR REVIEW ALL MEDS BY PRESCRIBER/CLIN PHARMACIST DOCUMENTED: ICD-10-PCS | Mod: CPTII,S$GLB,, | Performed by: FAMILY MEDICINE

## 2023-03-22 PROCEDURE — 3078F DIAST BP <80 MM HG: CPT | Mod: CPTII,S$GLB,, | Performed by: FAMILY MEDICINE

## 2023-03-22 PROCEDURE — 3288F PR FALLS RISK ASSESSMENT DOCUMENTED: ICD-10-PCS | Mod: CPTII,S$GLB,, | Performed by: FAMILY MEDICINE

## 2023-03-22 PROCEDURE — 3008F BODY MASS INDEX DOCD: CPT | Mod: CPTII,S$GLB,, | Performed by: FAMILY MEDICINE

## 2023-03-22 RX ORDER — NITROGLYCERIN 0.4 MG/1
0.4 TABLET SUBLINGUAL EVERY 5 MIN PRN
Qty: 25 TABLET | Refills: 1 | Status: SHIPPED | OUTPATIENT
Start: 2023-03-22

## 2023-03-22 NOTE — PROGRESS NOTES
"Subjective:       Patient ID: Sammy Kam is a 72 y.o. male.    Chief Complaint: Follow-up (Pt in for ER follow-up)    NSTEMI late last month, underwent coronary angiography with PCI.  Had 1 episode a few days after discharge of mild chest discomfort, but denies pain.  Symptoms went away after a few minutes and have not recurred.  He is otherwise been chest pain-free, no shortness of breath, dizziness, palpitations, nausea, vomiting, diaphoresis or lightheadedness.  Compliant with all medications.  He has since followed up with Cardiology.  Requesting new prescription for nitroglycerin, has not filled or taken this in several years.    Review of Systems   Constitutional:  Negative for chills and fever.   Respiratory:  Negative for shortness of breath.    Cardiovascular:  Negative for chest pain and palpitations.   Gastrointestinal:  Negative for blood in stool.   Neurological:  Negative for dizziness and light-headedness.   Hematological:  Does not bruise/bleed easily.     Objective:      Vitals:    03/22/23 1120   BP: 124/68   BP Location: Left arm   Patient Position: Sitting   BP Method: Medium (Manual)   Pulse: 65   Resp: 18   Temp: 98.2 °F (36.8 °C)   TempSrc: Temporal   SpO2: 99%   Weight: 87.8 kg (193 lb 9 oz)   Height: 5' 6" (1.676 m)     Physical Exam  Vitals and nursing note reviewed.   Constitutional:       General: He is not in acute distress.     Appearance: Normal appearance. He is well-developed.   HENT:      Head: Normocephalic and atraumatic.   Cardiovascular:      Rate and Rhythm: Normal rate and regular rhythm.      Heart sounds: Normal heart sounds.   Pulmonary:      Effort: Pulmonary effort is normal.      Breath sounds: Normal breath sounds.   Musculoskeletal:      Right lower leg: No edema.      Left lower leg: No edema.   Skin:     General: Skin is warm and dry.   Neurological:      Mental Status: He is alert and oriented to person, place, and time.   Psychiatric:         Mood and Affect: " Mood normal.         Behavior: Behavior normal.       Lab Results   Component Value Date    WBC 5.41 02/28/2023    HGB 12.6 (L) 02/28/2023    HCT 39.2 (L) 02/28/2023     (L) 02/28/2023    CHOL 155 12/14/2022    TRIG 263 (H) 12/14/2022    HDL 34 (L) 12/14/2022    ALT 17 02/25/2023    AST 19 02/25/2023     02/28/2023    K 3.9 02/28/2023     02/28/2023    CREATININE 1.3 02/28/2023    BUN 15 02/28/2023    CO2 24 02/28/2023    PSA 2.1 12/14/2022    INR 1.0 02/25/2023    HGBA1C 6.3 (H) 12/14/2022    Patient Information    Name MRN Description   Sammy Kam [id number] 72 y.o. male     Physicians    Panel Physicians Referring Physician Case Authorizing Physician   Miguel Angel Silverman MD (Primary) Aaareferral Self MD Bret Cohn MD (Assisting)       Indications    NSTEMI (non-ST elevated myocardial infarction) [I21.4 (ICD-10-CM)]     Summary         The Mid Cx lesion was 90% stenosed with 0% stenosis post-intervention.    The LAD is a  just beyond the first diagonal branch and the first septal .  The LAD is collateralized by the right posterior descending artery.    The culprit lesion was a 90% mid left circumflex lesion just prior to the takeoff of the OM branches.  The lesion was pre-dilated with a compliant 2.5 mm x 15 mm balloon.  The lesion was then stented with a Resolute Kamaljit 3.0 mm x 15 mm drug-eluting stent.    Otherwise nonobstructive coronary artery disease as described in the body of the report.     Assessment:       1. Atherosclerosis of native coronary artery of native heart without angina pectoris    2. Thrombocytopenia, unspecified    3. Primary hypertension    4. Hyperlipidemia, unspecified hyperlipidemia type    5. Encounter for abdominal aortic aneurysm (AAA) screening    6. History of colon polyps    7. Colon cancer screening          Plan:       Atherosclerosis of native coronary artery of native heart without angina pectoris  -     nitroGLYCERIN  (NITROSTAT) 0.4 MG SL tablet; Place 1 tablet (0.4 mg total) under the tongue every 5 (five) minutes as needed for Chest pain.  Dispense: 25 tablet; Refill: 1  Continue dual antiplatelet therapy for 1 year post intervention  Thrombocytopenia, unspecified  Chronic, stable, asymptomatic  Primary hypertension  Continue current meds  Hyperlipidemia, unspecified hyperlipidemia type  Continue statin  Encounter for abdominal aortic aneurysm (AAA) screening  -      Abdominal Aorta; Future; Expected date: 03/22/2023    History of colon polyps  Will be due for repeat screening colonoscopy in the next year or so.  Would be best to wait until he has completed 12 months of dual antiplatelet therapy  Colon cancer screening      Medication List with Changes/Refills   New Medications    NITROGLYCERIN (NITROSTAT) 0.4 MG SL TABLET    Place 1 tablet (0.4 mg total) under the tongue every 5 (five) minutes as needed for Chest pain.   Current Medications    ARTIFICIAL TEARS,HYPROMELLOSE,,GENTEAL/SUSTANE, 0.3 % GEL    APPLY TO EACH EYE AT BEDTIME    ASPIRIN (ECOTRIN) 81 MG EC TABLET    Take 81 mg by mouth once daily.      B COMPLEX VITAMINS TABLET    Take 1 tablet by mouth once daily.    CARBOXYMETHYLCELLULOSE (REFRESH PLUS) 0.5 % DPET    INSTILL 2 DROP(S) IN EACH EYE SIX TIMES DAILY AS NEEDED FOR DRY EYES    CHOLECALCIFEROL, VITAMIN D3, (VITAMIN D3) 50 MCG (2,000 UNIT) TAB    TAKE ONE TABLET BY MOUTH EVERY DAY AS A VITAMIN SUPPLEMENT    CLOPIDOGREL (PLAVIX) 75 MG TABLET    Take 75 mg by mouth.    KETOCONAZOLE (NIZORAL) 2 % SHAMPOO    Apply topically twice a week.      METOPROLOL SUCCINATE (TOPROL-XL) 100 MG 24 HR TABLET    Take 1 tablet (100 mg total) by mouth 2 (two) times daily.    ROSUVASTATIN (CRESTOR) 40 MG TAB    Take 1 tablet (40 mg total) by mouth every evening.    TERBINAFINE HCL (LAMISIL) 1 % CREAM    APPLY SMALL AMOUNT TOPICALLY TWICE A DAY FOR FUNGAL INFECTION APPLY TO FEET/TOES FOR FUNGAL INFECTION    TRAZODONE (DESYREL) 50  MG TABLET    Take 1 tablet (50 mg total) by mouth every evening.

## 2023-04-06 DIAGNOSIS — K76.9 LIVER DISEASE, UNSPECIFIED: Primary | ICD-10-CM

## 2023-04-26 ENCOUNTER — OFFICE VISIT (OUTPATIENT)
Dept: CARDIOLOGY | Facility: CLINIC | Age: 73
End: 2023-04-26
Payer: MEDICARE

## 2023-04-26 VITALS
SYSTOLIC BLOOD PRESSURE: 116 MMHG | WEIGHT: 193.44 LBS | HEIGHT: 66 IN | BODY MASS INDEX: 31.09 KG/M2 | DIASTOLIC BLOOD PRESSURE: 80 MMHG | HEART RATE: 70 BPM

## 2023-04-26 DIAGNOSIS — I10 PRIMARY HYPERTENSION: ICD-10-CM

## 2023-04-26 DIAGNOSIS — E78.2 MIXED HYPERLIPIDEMIA: ICD-10-CM

## 2023-04-26 DIAGNOSIS — I21.4 NSTEMI (NON-ST ELEVATED MYOCARDIAL INFARCTION): Primary | ICD-10-CM

## 2023-04-26 PROCEDURE — 3288F PR FALLS RISK ASSESSMENT DOCUMENTED: ICD-10-PCS | Mod: CPTII,S$GLB,, | Performed by: INTERNAL MEDICINE

## 2023-04-26 PROCEDURE — 99214 OFFICE O/P EST MOD 30 MIN: CPT | Mod: S$GLB,,, | Performed by: INTERNAL MEDICINE

## 2023-04-26 PROCEDURE — 99999 PR PBB SHADOW E&M-EST. PATIENT-LVL III: ICD-10-PCS | Mod: PBBFAC,,, | Performed by: INTERNAL MEDICINE

## 2023-04-26 PROCEDURE — 1159F MED LIST DOCD IN RCRD: CPT | Mod: CPTII,S$GLB,, | Performed by: INTERNAL MEDICINE

## 2023-04-26 PROCEDURE — 1101F PR PT FALLS ASSESS DOC 0-1 FALLS W/OUT INJ PAST YR: ICD-10-PCS | Mod: CPTII,S$GLB,, | Performed by: INTERNAL MEDICINE

## 2023-04-26 PROCEDURE — 99999 PR PBB SHADOW E&M-EST. PATIENT-LVL III: CPT | Mod: PBBFAC,,, | Performed by: INTERNAL MEDICINE

## 2023-04-26 PROCEDURE — 1101F PT FALLS ASSESS-DOCD LE1/YR: CPT | Mod: CPTII,S$GLB,, | Performed by: INTERNAL MEDICINE

## 2023-04-26 PROCEDURE — 3074F SYST BP LT 130 MM HG: CPT | Mod: CPTII,S$GLB,, | Performed by: INTERNAL MEDICINE

## 2023-04-26 PROCEDURE — 3008F PR BODY MASS INDEX (BMI) DOCUMENTED: ICD-10-PCS | Mod: CPTII,S$GLB,, | Performed by: INTERNAL MEDICINE

## 2023-04-26 PROCEDURE — 3074F PR MOST RECENT SYSTOLIC BLOOD PRESSURE < 130 MM HG: ICD-10-PCS | Mod: CPTII,S$GLB,, | Performed by: INTERNAL MEDICINE

## 2023-04-26 PROCEDURE — 3079F PR MOST RECENT DIASTOLIC BLOOD PRESSURE 80-89 MM HG: ICD-10-PCS | Mod: CPTII,S$GLB,, | Performed by: INTERNAL MEDICINE

## 2023-04-26 PROCEDURE — 3288F FALL RISK ASSESSMENT DOCD: CPT | Mod: CPTII,S$GLB,, | Performed by: INTERNAL MEDICINE

## 2023-04-26 PROCEDURE — 1159F PR MEDICATION LIST DOCUMENTED IN MEDICAL RECORD: ICD-10-PCS | Mod: CPTII,S$GLB,, | Performed by: INTERNAL MEDICINE

## 2023-04-26 PROCEDURE — 1126F AMNT PAIN NOTED NONE PRSNT: CPT | Mod: CPTII,S$GLB,, | Performed by: INTERNAL MEDICINE

## 2023-04-26 PROCEDURE — 1126F PR PAIN SEVERITY QUANTIFIED, NO PAIN PRESENT: ICD-10-PCS | Mod: CPTII,S$GLB,, | Performed by: INTERNAL MEDICINE

## 2023-04-26 PROCEDURE — 3008F BODY MASS INDEX DOCD: CPT | Mod: CPTII,S$GLB,, | Performed by: INTERNAL MEDICINE

## 2023-04-26 PROCEDURE — 99214 PR OFFICE/OUTPT VISIT, EST, LEVL IV, 30-39 MIN: ICD-10-PCS | Mod: S$GLB,,, | Performed by: INTERNAL MEDICINE

## 2023-04-26 PROCEDURE — 3079F DIAST BP 80-89 MM HG: CPT | Mod: CPTII,S$GLB,, | Performed by: INTERNAL MEDICINE

## 2023-04-26 NOTE — PROGRESS NOTES
Cardiology    4/26/2023  1:28 PM    Problem list  Patient Active Problem List   Diagnosis    Sensorineural hearing loss    Tinnitus, subjective    Atherosclerosis of native coronary artery of native heart without angina pectoris    Dry eye syndrome of bilateral lacrimal glands    Primary hypertension    Hyperlipidemia    Prediabetes    Radiculopathy, cervical region    History of colon polyps    Thrombocytopenia, unspecified       CC:  Establish care back with us at [place]    HPI:  Patient wants to reestablish care with us.  Patient was last seen by Dr Acosta at [facility].  He was admitted in February with ACS underwent successful PCI of the left circumflex artery.  Patient has been doing well.  Denies any angina, dyspnea exertion, palpitation or syncope.  He any bleeding.  He is tolerating his medications.    Medications  Current Outpatient Medications   Medication Sig Dispense Refill    artificial tears,hypromellose,,GENTEAL/SUSTANE, 0.3 % Gel APPLY TO EACH EYE AT BEDTIME      aspirin (ECOTRIN) 81 MG EC tablet Take 81 mg by mouth once daily.        b complex vitamins tablet Take 1 tablet by mouth once daily.      carboxymethylcellulose (REFRESH PLUS) 0.5 % Dpet INSTILL 2 DROP(S) IN EACH EYE SIX TIMES DAILY AS NEEDED FOR DRY EYES      cholecalciferol, vitamin D3, (VITAMIN D3) 50 mcg (2,000 unit) Tab TAKE ONE TABLET BY MOUTH EVERY DAY AS A VITAMIN SUPPLEMENT      clopidogreL (PLAVIX) 75 mg tablet Take 75 mg by mouth.      ketoconazole (NIZORAL) 2 % shampoo Apply topically twice a week.        metoprolol succinate (TOPROL-XL) 100 MG 24 hr tablet Take 1 tablet (100 mg total) by mouth 2 (two) times daily. 60 tablet 11    nitroGLYCERIN (NITROSTAT) 0.4 MG SL tablet Place 1 tablet (0.4 mg total) under the tongue every 5 (five) minutes as needed for Chest pain. 25 tablet 1    rosuvastatin (CRESTOR) 40 MG Tab Take 1 tablet (40 mg total) by mouth every evening. 90 tablet 3    terbinafine HCL (LAMISIL) 1 % cream APPLY  SMALL AMOUNT TOPICALLY TWICE A DAY FOR FUNGAL INFECTION APPLY TO FEET/TOES FOR FUNGAL INFECTION      traZODone (DESYREL) 50 MG tablet Take 1 tablet (50 mg total) by mouth every evening. 30 tablet 11     No current facility-administered medications for this visit.      Prior to Admission medications    Medication Sig Start Date End Date Taking? Authorizing Provider   artificial tears,hypromellose,,GENTEAL/SUSTANE, 0.3 % Gel APPLY TO EACH EYE AT BEDTIME 9/15/22  Yes Historical Provider   aspirin (ECOTRIN) 81 MG EC tablet Take 81 mg by mouth once daily.     Yes Historical Provider   b complex vitamins tablet Take 1 tablet by mouth once daily. 9/15/22  Yes Historical Provider   carboxymethylcellulose (REFRESH PLUS) 0.5 % Dpet INSTILL 2 DROP(S) IN EACH EYE SIX TIMES DAILY AS NEEDED FOR DRY EYES 9/15/22  Yes Historical Provider   cholecalciferol, vitamin D3, (VITAMIN D3) 50 mcg (2,000 unit) Tab TAKE ONE TABLET BY MOUTH EVERY DAY AS A VITAMIN SUPPLEMENT 9/14/22  Yes Historical Provider   clopidogreL (PLAVIX) 75 mg tablet Take 75 mg by mouth. 1/1/23  Yes Historical Provider   ketoconazole (NIZORAL) 2 % shampoo Apply topically twice a week.     Yes Historical Provider   metoprolol succinate (TOPROL-XL) 100 MG 24 hr tablet Take 1 tablet (100 mg total) by mouth 2 (two) times daily. 2/28/23 2/28/24 Yes Ousmane Mccain II, MD   nitroGLYCERIN (NITROSTAT) 0.4 MG SL tablet Place 1 tablet (0.4 mg total) under the tongue every 5 (five) minutes as needed for Chest pain. 3/22/23  Yes Bret Rice MD   rosuvastatin (CRESTOR) 40 MG Tab Take 1 tablet (40 mg total) by mouth every evening. 2/28/23 2/28/24 Yes Ousmane Mccain II, MD   terbinafine HCL (LAMISIL) 1 % cream APPLY SMALL AMOUNT TOPICALLY TWICE A DAY FOR FUNGAL INFECTION APPLY TO FEET/TOES FOR FUNGAL INFECTION 9/15/22  Yes Historical Provider   traZODone (DESYREL) 50 MG tablet Take 1 tablet (50 mg total) by mouth every evening. 2/28/23 2/28/24 Yes Ousmane Mccain II, MD          History  Past Medical History:   Diagnosis Date    Anxiety     Basal cell carcinoma     Colon polyp     Coronary artery disease     Depression     Dyslipidemia     Hypertension     NSTEMI (non-ST elevated myocardial infarction) 12/13/2022    BNP and Creat normal.  CP with elevated Troponins.  Hx of CAD and  LAD 2018.  Cardiology consulted.  Patient's chest pain dissipated after 5 hours in the hospital.  He was taken to the cath lab and cardiac catheterization was done and stent was placed he was completely pain free following this he was watched an additional 16 hours with no return of chest pain he was cleared by Cardiology for di    PTSD (post-traumatic stress disorder)     Submucosal lesion of stomach      Past Surgical History:   Procedure Laterality Date    CARDIAC CATHETERIZATION  02/27/2023    stent placed mid circ    CORONARY ANGIOGRAPHY N/A 2/27/2023    Procedure: ANGIOGRAM, CORONARY ARTERY;  Surgeon: Miguel Angel Silverman MD;  Location: [facility] CATH LAB;  Service: Interventional;  Laterality: N/A;  radial    CORONARY ANGIOPLASTY WITH STENT PLACEMENT Right 2/27/2023    Procedure: ANGIOPLASTY, CORONARY ARTERY, WITH STENT INSERTION;  Surgeon: Miguel Angel Silverman MD;  Location: [facility] CATH LAB;  Service: Interventional;  Laterality: Right;    cyst removed from neck      skin cancer removed  01/01/2000     Social History     Socioeconomic History    Marital status:    Tobacco Use    Smoking status: Former     Packs/day: 0.25     Years: 2.00     Pack years: 0.50     Types: Cigarettes   Substance and Sexual Activity    Alcohol use: No    Drug use: No     Social Determinants of Health     Financial Resource Strain: Low Risk     Difficulty of Paying Living Expenses: Not very hard   Food Insecurity: No Food Insecurity    Worried About Running Out of Food in the Last Year: Never true    Ran Out of Food in the Last Year: Never true   Transportation Needs: Unknown    Lack of Transportation (Non-Medical): No    Physical Activity: Insufficiently Active    Days of Exercise per Week: 4 days    Minutes of Exercise per Session: 30 min   Stress: No Stress Concern Present    Feeling of Stress : Only a little   Social Connections: Unknown    Frequency of Communication with Friends and Family: Twice a week    Frequency of Social Gatherings with Friends and Family: Twice a week    Attends [employer] Services: 1 to 4 times per year    Active Member of Clubs or Organizations: No    Attends Club or Organization Meetings: 1 to 4 times per year   Housing Stability: Low Risk     Unable to Pay for Housing in the Last Year: No    Number of Places Lived in the Last Year: 1    Unstable Housing in the Last Year: No         Allergies  Review of patient's allergies indicates:   Allergen Reactions    Atorvastatin Tinitus     Other reaction(s): Tinnitus, Insomnia         Review of Systems   Review of Systems   Constitutional: Negative for fever.   HENT:  Negative for nosebleeds.    Eyes:  Negative for visual disturbance.   Cardiovascular:  Negative for chest pain, claudication, dyspnea on exertion, palpitations and syncope.   Respiratory:  Negative for cough, hemoptysis and wheezing.    Endocrine: Negative for cold intolerance, heat intolerance, polyphagia and polyuria.   Hematologic/Lymphatic: Negative for bleeding problem.   Skin:  Negative for rash.   Musculoskeletal:  Negative for myalgias.   Gastrointestinal:  Negative for hematemesis, hematochezia, nausea and vomiting.   Genitourinary:  Negative for dysuria.   Neurological:  Negative for focal weakness and sensory change.   Psychiatric/Behavioral:  Negative for altered mental status.        Physical Exam  Wt Readings from Last 1 Encounters:   04/26/23 87.8 kg (193 lb 7.3 oz)     BP Readings from Last 3 Encounters:   04/26/23 116/80   03/22/23 124/68   03/03/23 123/65     Pulse Readings from Last 1 Encounters:   04/26/23 70     Body mass index is 31.22 kg/m².    Physical Exam  Vitals  reviewed.   Constitutional:       Appearance: He is well-developed.   HENT:      Head: Atraumatic.   Eyes:      General: No scleral icterus.  Neck:      Vascular: Normal carotid pulses. No carotid bruit, hepatojugular reflux or JVD.   Cardiovascular:      Rate and Rhythm: Normal rate and regular rhythm.      Chest Wall: PMI is not displaced.      Pulses: Intact distal pulses.           Carotid pulses are 2+ on the right side and 2+ on the left side.       Radial pulses are 2+ on the right side and 2+ on the left side.        Dorsalis pedis pulses are 2+ on the right side and 2+ on the left side.      Heart sounds: Normal heart sounds, S1 normal and S2 normal. No murmur heard.    No friction rub.   Pulmonary:      Effort: Pulmonary effort is normal. No respiratory distress.      Breath sounds: Normal breath sounds. No stridor. No wheezing or rales.   Chest:      Chest wall: No tenderness.   Abdominal:      General: Bowel sounds are normal.      Palpations: Abdomen is soft.   Musculoskeletal:      Cervical back: Neck supple. No edema.   Skin:     General: Skin is warm and dry.      Nails: There is no clubbing.   Neurological:      Mental Status: He is alert and oriented to person, place, and time.   Psychiatric:         Behavior: Behavior normal.         Thought Content: Thought content normal.           Assessment  1. NSTEMI (non-ST elevated myocardial infarction)  S/p PCI of Lcx in Feb 2023    2. Mixed hyperlipidemia  On statin    3. Primary hypertension  Controlled on meds, continue to monitor        Plan and Discussion  He is doing well.  He denies any angina.  Recommend to continue his current medications.  Reinforced the importance of taking both aspirin and clopidogrel given his recent stent in February.    Follow Up  6 months      Miguel Angel Silverman MD, F.A.C.C, F.S.C.A.I.        30 minutes were spent in chart review, documentation and review of results, and evaluation, treatment, and counseling of patient on  the same day of service.    Disclaimer: This document was created using voice recognition software ([employer]). Although it may be edited, this document may contain errors related to incorrect recognition of the spoken word. Please call the physician if clarification is needed.

## 2023-07-05 ENCOUNTER — TELEPHONE (OUTPATIENT)
Dept: PRIMARY CARE CLINIC | Facility: CLINIC | Age: 73
End: 2023-07-05
Payer: MEDICARE

## 2023-07-05 RX ORDER — TRAZODONE HYDROCHLORIDE 100 MG/1
100 TABLET ORAL NIGHTLY PRN
Qty: 90 TABLET | Refills: 1 | Status: SHIPPED | OUTPATIENT
Start: 2023-07-05

## 2023-07-05 NOTE — TELEPHONE ENCOUNTER
Yes, he can double up on the 50 mg tablets of trazodone.  I also sent a prescription for 100 mg tablets to [facility].

## 2023-07-05 NOTE — TELEPHONE ENCOUNTER
Called pt regarding message. Pt stated his insomnia has worsen. Pt would like to know if he can take two Trazodone 50 MG instead of one?

## 2023-07-05 NOTE — TELEPHONE ENCOUNTER
----- Message from Flor Brunner sent at 7/5/2023  3:01 PM CDT -----  Contact: [phone]  Patient is calling for Medical Advice regarding:Patient says that he has not been sleeping very well and he is taking trazodone 50 MG and would like to know if he can take two pills, please call and advise.

## 2023-07-10 ENCOUNTER — TELEPHONE (OUTPATIENT)
Dept: PRIMARY CARE CLINIC | Facility: CLINIC | Age: 73
End: 2023-07-10
Payer: MEDICARE

## 2023-07-10 NOTE — TELEPHONE ENCOUNTER
----- Message from Carlotta Nicolas sent at 7/10/2023 12:48 PM CDT -----  Patient feels he needs a sleep study please call patient back

## 2023-07-14 NOTE — TELEPHONE ENCOUNTER
Called pt regarding message. Pt stated he would like to wait a week before he's  for this. Pt stated his sleeping has improved. Verbalized understanding.

## 2023-07-24 RX ORDER — CLOPIDOGREL BISULFATE 75 MG/1
75 TABLET ORAL DAILY
Qty: 90 TABLET | Refills: 1 | Status: SHIPPED | OUTPATIENT
Start: 2023-07-24 | End: 2024-02-02

## 2023-07-24 NOTE — TELEPHONE ENCOUNTER
No care due was identified.  [facility] Embedded Care Due Messages. Reference number: [id number].   7/24/2023 10:11:26 AM CDT

## 2023-07-24 NOTE — TELEPHONE ENCOUNTER
----- Message from Radha Galloway sent at 7/24/2023 10:05 AM CDT -----  Contact: [phone]  Requesting an RX refill or new RX.  Is this a refill or new RX: refil  RX name and strength (copy/paste from chart):  clopidogreL (PLAVIX) 75 mg tablet  Is this a 30 day or 90 day RX: 30 day   Pharmacy name and phone # (copy/paste from chart):    [facility] [place] - [address] [place] [address]  Phone: [phone] Fax: [phone]  The doctors have asked that we provide their patients with the following 2 reminders -- prescription refills can take up to 72 hours, and a friendly reminder that in the future you can use your [facility] account to request refills: aware

## 2023-09-08 ENCOUNTER — PATIENT OUTREACH (OUTPATIENT)
Dept: ADMINISTRATIVE | Facility: HOSPITAL | Age: 73
End: 2023-09-08
Payer: MEDICARE

## 2023-09-08 NOTE — PROGRESS NOTES
Health Maintenance Due   Topic Date Due    Colorectal Cancer Screening  Never done    COVID-19 Vaccine (6 - Pfizer series) 02/03/2023    Influenza Vaccine (1) 09/01/2023        Chart review done.   HM updated.   Immunizations reviewed & updated.   Care Everywhere updated.

## 2023-09-22 ENCOUNTER — OFFICE VISIT (OUTPATIENT)
Dept: PRIMARY CARE CLINIC | Facility: CLINIC | Age: 73
End: 2023-09-22
Payer: MEDICARE

## 2023-09-22 VITALS
SYSTOLIC BLOOD PRESSURE: 130 MMHG | BODY MASS INDEX: 30.62 KG/M2 | HEIGHT: 66 IN | RESPIRATION RATE: 18 BRPM | DIASTOLIC BLOOD PRESSURE: 70 MMHG | OXYGEN SATURATION: 98 % | WEIGHT: 190.5 LBS | HEART RATE: 85 BPM | TEMPERATURE: 98 F

## 2023-09-22 DIAGNOSIS — Z12.12 ENCOUNTER FOR COLORECTAL CANCER SCREENING: ICD-10-CM

## 2023-09-22 DIAGNOSIS — E78.5 HYPERLIPIDEMIA, UNSPECIFIED HYPERLIPIDEMIA TYPE: ICD-10-CM

## 2023-09-22 DIAGNOSIS — I25.10 ATHEROSCLEROSIS OF NATIVE CORONARY ARTERY OF NATIVE HEART WITHOUT ANGINA PECTORIS: Primary | ICD-10-CM

## 2023-09-22 DIAGNOSIS — Z12.11 ENCOUNTER FOR COLORECTAL CANCER SCREENING: ICD-10-CM

## 2023-09-22 DIAGNOSIS — D69.6 THROMBOCYTOPENIA, UNSPECIFIED: ICD-10-CM

## 2023-09-22 DIAGNOSIS — R73.03 PREDIABETES: ICD-10-CM

## 2023-09-22 DIAGNOSIS — I10 PRIMARY HYPERTENSION: ICD-10-CM

## 2023-09-22 PROCEDURE — 1101F PT FALLS ASSESS-DOCD LE1/YR: CPT | Mod: CPTII,S$GLB,, | Performed by: FAMILY MEDICINE

## 2023-09-22 PROCEDURE — 1126F AMNT PAIN NOTED NONE PRSNT: CPT | Mod: CPTII,S$GLB,, | Performed by: FAMILY MEDICINE

## 2023-09-22 PROCEDURE — 99214 OFFICE O/P EST MOD 30 MIN: CPT | Mod: S$GLB,,, | Performed by: FAMILY MEDICINE

## 2023-09-22 PROCEDURE — 3078F DIAST BP <80 MM HG: CPT | Mod: CPTII,S$GLB,, | Performed by: FAMILY MEDICINE

## 2023-09-22 PROCEDURE — 1159F MED LIST DOCD IN RCRD: CPT | Mod: CPTII,S$GLB,, | Performed by: FAMILY MEDICINE

## 2023-09-22 PROCEDURE — 3078F PR MOST RECENT DIASTOLIC BLOOD PRESSURE < 80 MM HG: ICD-10-PCS | Mod: CPTII,S$GLB,, | Performed by: FAMILY MEDICINE

## 2023-09-22 PROCEDURE — 3288F PR FALLS RISK ASSESSMENT DOCUMENTED: ICD-10-PCS | Mod: CPTII,S$GLB,, | Performed by: FAMILY MEDICINE

## 2023-09-22 PROCEDURE — 1160F PR REVIEW ALL MEDS BY PRESCRIBER/CLIN PHARMACIST DOCUMENTED: ICD-10-PCS | Mod: CPTII,S$GLB,, | Performed by: FAMILY MEDICINE

## 2023-09-22 PROCEDURE — 3075F SYST BP GE 130 - 139MM HG: CPT | Mod: CPTII,S$GLB,, | Performed by: FAMILY MEDICINE

## 2023-09-22 PROCEDURE — 3008F BODY MASS INDEX DOCD: CPT | Mod: CPTII,S$GLB,, | Performed by: FAMILY MEDICINE

## 2023-09-22 PROCEDURE — 99214 PR OFFICE/OUTPT VISIT, EST, LEVL IV, 30-39 MIN: ICD-10-PCS | Mod: S$GLB,,, | Performed by: FAMILY MEDICINE

## 2023-09-22 PROCEDURE — 1160F RVW MEDS BY RX/DR IN RCRD: CPT | Mod: CPTII,S$GLB,, | Performed by: FAMILY MEDICINE

## 2023-09-22 PROCEDURE — 3008F PR BODY MASS INDEX (BMI) DOCUMENTED: ICD-10-PCS | Mod: CPTII,S$GLB,, | Performed by: FAMILY MEDICINE

## 2023-09-22 PROCEDURE — 1101F PR PT FALLS ASSESS DOC 0-1 FALLS W/OUT INJ PAST YR: ICD-10-PCS | Mod: CPTII,S$GLB,, | Performed by: FAMILY MEDICINE

## 2023-09-22 PROCEDURE — 3288F FALL RISK ASSESSMENT DOCD: CPT | Mod: CPTII,S$GLB,, | Performed by: FAMILY MEDICINE

## 2023-09-22 PROCEDURE — 1126F PR PAIN SEVERITY QUANTIFIED, NO PAIN PRESENT: ICD-10-PCS | Mod: CPTII,S$GLB,, | Performed by: FAMILY MEDICINE

## 2023-09-22 PROCEDURE — 99999 PR PBB SHADOW E&M-EST. PATIENT-LVL IV: ICD-10-PCS | Mod: PBBFAC,,, | Performed by: FAMILY MEDICINE

## 2023-09-22 PROCEDURE — 1159F PR MEDICATION LIST DOCUMENTED IN MEDICAL RECORD: ICD-10-PCS | Mod: CPTII,S$GLB,, | Performed by: FAMILY MEDICINE

## 2023-09-22 PROCEDURE — 99999 PR PBB SHADOW E&M-EST. PATIENT-LVL IV: CPT | Mod: PBBFAC,,, | Performed by: FAMILY MEDICINE

## 2023-09-22 PROCEDURE — 3075F PR MOST RECENT SYSTOLIC BLOOD PRESS GE 130-139MM HG: ICD-10-PCS | Mod: CPTII,S$GLB,, | Performed by: FAMILY MEDICINE

## 2023-09-22 NOTE — PROGRESS NOTES
"Subjective:       Patient ID: Sammy Kam is a 73 y.o. male.    Chief Complaint: Follow-up (6 month follow up)    Sammy Kam is a 73 y.o. male seen today for a routine checkup. The patient has no specific complaints or concerns at this time.  No recent illness or injury.  Compliant with all prescribed medications without adverse side effects.       Review of Systems   Constitutional:  Negative for chills, fatigue and fever.   HENT:  Negative for congestion.    Eyes:  Negative for visual disturbance.   Respiratory:  Negative for cough and shortness of breath.    Cardiovascular:  Negative for chest pain and palpitations.   Gastrointestinal:  Negative for abdominal pain, blood in stool, nausea and vomiting.   Genitourinary:  Negative for difficulty urinating.   Musculoskeletal:  Negative for arthralgias.   Skin:  Negative for rash.   Neurological:  Negative for dizziness.   Hematological:  Bruises/bleeds easily.   Psychiatric/Behavioral:  Negative for sleep disturbance.        Objective:      Vitals:    09/22/23 1048   BP: 130/70   BP Location: Left arm   Patient Position: Sitting   BP Method: Medium (Manual)   Pulse: 85   Resp: 18   Temp: 97.8 °F (36.6 °C)   TempSrc: Temporal   SpO2: 98%   Weight: 86.4 kg (190 lb 7.6 oz)   Height: 5' 6" (1.676 m)     BP Readings from Last 5 Encounters:   09/22/23 130/70   04/26/23 116/80   03/22/23 124/68   03/03/23 123/65   02/28/23 129/68     Wt Readings from Last 5 Encounters:   09/22/23 86.4 kg (190 lb 7.6 oz)   04/26/23 87.8 kg (193 lb 7.3 oz)   03/22/23 87.8 kg (193 lb 9 oz)   03/03/23 86.7 kg (191 lb 0.5 oz)   02/27/23 83.9 kg (185 lb)     Physical Exam  Vitals and nursing note reviewed.   Constitutional:       General: He is not in acute distress.     Appearance: Normal appearance. He is well-developed.   HENT:      Head: Normocephalic and atraumatic.   Cardiovascular:      Rate and Rhythm: Normal rate and regular rhythm.      Heart sounds: Normal heart sounds. "   Pulmonary:      Effort: Pulmonary effort is normal.      Breath sounds: Normal breath sounds.   Musculoskeletal:      Right lower leg: No edema.      Left lower leg: No edema.   Skin:     General: Skin is warm and dry.   Neurological:      Mental Status: He is alert and oriented to person, place, and time.   Psychiatric:         Mood and Affect: Mood normal.         Behavior: Behavior normal.         Lab Results   Component Value Date    WBC 5.41 02/28/2023    HGB 12.6 (L) 02/28/2023    HCT 39.2 (L) 02/28/2023     (L) 02/28/2023    CHOL 155 12/14/2022    TRIG 263 (H) 12/14/2022    HDL 34 (L) 12/14/2022    ALT 17 02/25/2023    AST 19 02/25/2023     02/28/2023    K 3.9 02/28/2023     02/28/2023    CREATININE 1.2 05/02/2023    BUN 15 02/28/2023    CO2 24 02/28/2023    PSA 2.1 12/14/2022    INR 1.0 02/25/2023    HGBA1C 6.3 (H) 12/14/2022      Assessment:       1. Atherosclerosis of native coronary artery of native heart without angina pectoris    2. Prediabetes    3. Hyperlipidemia, unspecified hyperlipidemia type    4. Primary hypertension    5. Thrombocytopenia, unspecified    6. Encounter for colorectal cancer screening        Plan:       Atherosclerosis of native coronary artery of native heart without angina pectoris  Stable, continue current meds.  Continue dual antiplatelet therapy for 1 year status post NSTEMI with PCI  Prediabetes  -     Hemoglobin A1C; Future; Expected date: 09/22/2023  Low carb diet  Hyperlipidemia, unspecified hyperlipidemia type  -     Comprehensive Metabolic Panel; Future; Expected date: 09/22/2023  -     Lipid Panel; Future; Expected date: 09/22/2023  Continue statin  Primary hypertension  Well controlled  Thrombocytopenia, unspecified  -     CBC Auto Differential; Future; Expected date: 09/22/2023    Encounter for colorectal cancer screening  Due for repeat colonoscopy, but ideally should wait until he completes a full 12 months of DAPT    Medication List with  Changes/Refills   Current Medications    ARTIFICIAL TEARS,HYPROMELLOSE,,GENTEAL/SUSTANE, 0.3 % GEL    APPLY TO EACH EYE AT BEDTIME    ASPIRIN (ECOTRIN) 81 MG EC TABLET    Take 81 mg by mouth once daily.      B COMPLEX VITAMINS TABLET    Take 1 tablet by mouth once daily.    CARBOXYMETHYLCELLULOSE (REFRESH PLUS) 0.5 % DPET    INSTILL 2 DROP(S) IN EACH EYE SIX TIMES DAILY AS NEEDED FOR DRY EYES    CHOLECALCIFEROL, VITAMIN D3, (VITAMIN D3) 50 MCG (2,000 UNIT) TAB    TAKE ONE TABLET BY MOUTH EVERY DAY AS A VITAMIN SUPPLEMENT    CLOPIDOGREL (PLAVIX) 75 MG TABLET    Take 1 tablet (75 mg total) by mouth once daily.    KETOCONAZOLE (NIZORAL) 2 % SHAMPOO    Apply topically twice a week.      METOPROLOL SUCCINATE (TOPROL-XL) 100 MG 24 HR TABLET    Take 1 tablet (100 mg total) by mouth 2 (two) times daily.    NITROGLYCERIN (NITROSTAT) 0.4 MG SL TABLET    Place 1 tablet (0.4 mg total) under the tongue every 5 (five) minutes as needed for Chest pain.    ROSUVASTATIN (CRESTOR) 40 MG TAB    Take 1 tablet (40 mg total) by mouth every evening.    TERBINAFINE HCL (LAMISIL) 1 % CREAM    APPLY SMALL AMOUNT TOPICALLY TWICE A DAY FOR FUNGAL INFECTION APPLY TO FEET/TOES FOR FUNGAL INFECTION    TRAZODONE (DESYREL) 100 MG TABLET    Take 1 tablet (100 mg total) by mouth nightly as needed for Insomnia.

## 2023-10-10 ENCOUNTER — OFFICE VISIT (OUTPATIENT)
Dept: CARDIOLOGY | Facility: CLINIC | Age: 73
End: 2023-10-10
Payer: MEDICARE

## 2023-10-10 VITALS
SYSTOLIC BLOOD PRESSURE: 118 MMHG | HEART RATE: 63 BPM | WEIGHT: 192.38 LBS | DIASTOLIC BLOOD PRESSURE: 72 MMHG | BODY MASS INDEX: 31.05 KG/M2

## 2023-10-10 DIAGNOSIS — I10 PRIMARY HYPERTENSION: ICD-10-CM

## 2023-10-10 DIAGNOSIS — E78.5 HYPERLIPIDEMIA, UNSPECIFIED HYPERLIPIDEMIA TYPE: ICD-10-CM

## 2023-10-10 DIAGNOSIS — I21.4 NSTEMI (NON-ST ELEVATED MYOCARDIAL INFARCTION): Primary | ICD-10-CM

## 2023-10-10 DIAGNOSIS — I25.10 ATHEROSCLEROSIS OF NATIVE CORONARY ARTERY OF NATIVE HEART WITHOUT ANGINA PECTORIS: ICD-10-CM

## 2023-10-10 PROCEDURE — 99999 PR PBB SHADOW E&M-EST. PATIENT-LVL III: CPT | Mod: PBBFAC,,, | Performed by: INTERNAL MEDICINE

## 2023-10-10 PROCEDURE — 1126F AMNT PAIN NOTED NONE PRSNT: CPT | Mod: CPTII,S$GLB,, | Performed by: INTERNAL MEDICINE

## 2023-10-10 PROCEDURE — 1159F PR MEDICATION LIST DOCUMENTED IN MEDICAL RECORD: ICD-10-PCS | Mod: CPTII,S$GLB,, | Performed by: INTERNAL MEDICINE

## 2023-10-10 PROCEDURE — 1126F PR PAIN SEVERITY QUANTIFIED, NO PAIN PRESENT: ICD-10-PCS | Mod: CPTII,S$GLB,, | Performed by: INTERNAL MEDICINE

## 2023-10-10 PROCEDURE — 3078F PR MOST RECENT DIASTOLIC BLOOD PRESSURE < 80 MM HG: ICD-10-PCS | Mod: CPTII,S$GLB,, | Performed by: INTERNAL MEDICINE

## 2023-10-10 PROCEDURE — 93000 EKG 12-LEAD: ICD-10-PCS | Mod: S$GLB,,, | Performed by: INTERNAL MEDICINE

## 2023-10-10 PROCEDURE — 3044F HG A1C LEVEL LT 7.0%: CPT | Mod: CPTII,S$GLB,, | Performed by: INTERNAL MEDICINE

## 2023-10-10 PROCEDURE — 1101F PR PT FALLS ASSESS DOC 0-1 FALLS W/OUT INJ PAST YR: ICD-10-PCS | Mod: CPTII,S$GLB,, | Performed by: INTERNAL MEDICINE

## 2023-10-10 PROCEDURE — 3078F DIAST BP <80 MM HG: CPT | Mod: CPTII,S$GLB,, | Performed by: INTERNAL MEDICINE

## 2023-10-10 PROCEDURE — 3008F PR BODY MASS INDEX (BMI) DOCUMENTED: ICD-10-PCS | Mod: CPTII,S$GLB,, | Performed by: INTERNAL MEDICINE

## 2023-10-10 PROCEDURE — 99214 PR OFFICE/OUTPT VISIT, EST, LEVL IV, 30-39 MIN: ICD-10-PCS | Mod: S$GLB,,, | Performed by: INTERNAL MEDICINE

## 2023-10-10 PROCEDURE — 3074F PR MOST RECENT SYSTOLIC BLOOD PRESSURE < 130 MM HG: ICD-10-PCS | Mod: CPTII,S$GLB,, | Performed by: INTERNAL MEDICINE

## 2023-10-10 PROCEDURE — 3288F PR FALLS RISK ASSESSMENT DOCUMENTED: ICD-10-PCS | Mod: CPTII,S$GLB,, | Performed by: INTERNAL MEDICINE

## 2023-10-10 PROCEDURE — 1160F RVW MEDS BY RX/DR IN RCRD: CPT | Mod: CPTII,S$GLB,, | Performed by: INTERNAL MEDICINE

## 2023-10-10 PROCEDURE — 99999 PR PBB SHADOW E&M-EST. PATIENT-LVL III: ICD-10-PCS | Mod: PBBFAC,,, | Performed by: INTERNAL MEDICINE

## 2023-10-10 PROCEDURE — 93000 ELECTROCARDIOGRAM COMPLETE: CPT | Mod: S$GLB,,, | Performed by: INTERNAL MEDICINE

## 2023-10-10 PROCEDURE — 3044F PR MOST RECENT HEMOGLOBIN A1C LEVEL <7.0%: ICD-10-PCS | Mod: CPTII,S$GLB,, | Performed by: INTERNAL MEDICINE

## 2023-10-10 PROCEDURE — 1101F PT FALLS ASSESS-DOCD LE1/YR: CPT | Mod: CPTII,S$GLB,, | Performed by: INTERNAL MEDICINE

## 2023-10-10 PROCEDURE — 3008F BODY MASS INDEX DOCD: CPT | Mod: CPTII,S$GLB,, | Performed by: INTERNAL MEDICINE

## 2023-10-10 PROCEDURE — 99214 OFFICE O/P EST MOD 30 MIN: CPT | Mod: S$GLB,,, | Performed by: INTERNAL MEDICINE

## 2023-10-10 PROCEDURE — 3074F SYST BP LT 130 MM HG: CPT | Mod: CPTII,S$GLB,, | Performed by: INTERNAL MEDICINE

## 2023-10-10 PROCEDURE — 1159F MED LIST DOCD IN RCRD: CPT | Mod: CPTII,S$GLB,, | Performed by: INTERNAL MEDICINE

## 2023-10-10 PROCEDURE — 1160F PR REVIEW ALL MEDS BY PRESCRIBER/CLIN PHARMACIST DOCUMENTED: ICD-10-PCS | Mod: CPTII,S$GLB,, | Performed by: INTERNAL MEDICINE

## 2023-10-10 PROCEDURE — 3288F FALL RISK ASSESSMENT DOCD: CPT | Mod: CPTII,S$GLB,, | Performed by: INTERNAL MEDICINE

## 2023-10-10 RX ORDER — FENOFIBRATE 160 MG/1
160 TABLET ORAL DAILY
Qty: 90 TABLET | Refills: 3 | Status: SHIPPED | OUTPATIENT
Start: 2023-10-10 | End: 2024-10-09

## 2023-10-10 NOTE — PROGRESS NOTES
Cardiology    10/10/2023  9:16 AM    Problem list  Patient Active Problem List   Diagnosis    Sensorineural hearing loss    Tinnitus, subjective    Atherosclerosis of native coronary artery of native heart without angina pectoris    Dry eye syndrome of bilateral lacrimal glands    Primary hypertension    Hyperlipidemia    Prediabetes    Radiculopathy, cervical region    History of colon polyps    Thrombocytopenia, unspecified       CC:  Follow-up    HPI:  He has been doing well.  He denies any angina, dyspnea on exertion, palpitation or syncope.  He admits to not following cardiac diet and eating more sweets.  He is also cut down on ice tea.    Medications  Current Outpatient Medications   Medication Sig Dispense Refill    artificial tears,hypromellose,,GENTEAL/SUSTANE, 0.3 % Gel APPLY TO EACH EYE AT BEDTIME      aspirin (ECOTRIN) 81 MG EC tablet Take 81 mg by mouth once daily.        b complex vitamins tablet Take 1 tablet by mouth once daily.      carboxymethylcellulose (REFRESH PLUS) 0.5 % Dpet INSTILL 2 DROP(S) IN EACH EYE SIX TIMES DAILY AS NEEDED FOR DRY EYES      cholecalciferol, vitamin D3, (VITAMIN D3) 50 mcg (2,000 unit) Tab TAKE ONE TABLET BY MOUTH EVERY DAY AS A VITAMIN SUPPLEMENT      clopidogreL (PLAVIX) 75 mg tablet Take 1 tablet (75 mg total) by mouth once daily. 90 tablet 1    ketoconazole (NIZORAL) 2 % shampoo Apply topically twice a week.        metoprolol succinate (TOPROL-XL) 100 MG 24 hr tablet Take 1 tablet (100 mg total) by mouth 2 (two) times daily. 60 tablet 11    nitroGLYCERIN (NITROSTAT) 0.4 MG SL tablet Place 1 tablet (0.4 mg total) under the tongue every 5 (five) minutes as needed for Chest pain. 25 tablet 1    rosuvastatin (CRESTOR) 40 MG Tab Take 1 tablet (40 mg total) by mouth every evening. 90 tablet 3    terbinafine HCL (LAMISIL) 1 % cream APPLY SMALL AMOUNT TOPICALLY TWICE A DAY FOR FUNGAL INFECTION APPLY TO FEET/TOES FOR FUNGAL INFECTION      traZODone (DESYREL) 100 MG  tablet Take 1 tablet (100 mg total) by mouth nightly as needed for Insomnia. 90 tablet 1    fenofibrate 160 MG Tab Take 1 tablet (160 mg total) by mouth once daily. 90 tablet 3     No current facility-administered medications for this visit.      Prior to Admission medications    Medication Sig Start Date End Date Taking? Authorizing Provider   artificial tears,hypromellose,,GENTEAL/SUSTANE, 0.3 % Gel APPLY TO EACH EYE AT BEDTIME 9/15/22  Yes Provider, Historical   aspirin (ECOTRIN) 81 MG EC tablet Take 81 mg by mouth once daily.     Yes Provider, Historical   b complex vitamins tablet Take 1 tablet by mouth once daily. 9/15/22  Yes Provider, Historical   carboxymethylcellulose (REFRESH PLUS) 0.5 % Dpet INSTILL 2 DROP(S) IN EACH EYE SIX TIMES DAILY AS NEEDED FOR DRY EYES 9/15/22  Yes Provider, Historical   cholecalciferol, vitamin D3, (VITAMIN D3) 50 mcg (2,000 unit) Tab TAKE ONE TABLET BY MOUTH EVERY DAY AS A VITAMIN SUPPLEMENT 9/14/22  Yes Provider, Historical   clopidogreL (PLAVIX) 75 mg tablet Take 1 tablet (75 mg total) by mouth once daily. 7/24/23  Yes Katharine Penny, NP   ketoconazole (NIZORAL) 2 % shampoo Apply topically twice a week.     Yes Provider, Historical   metoprolol succinate (TOPROL-XL) 100 MG 24 hr tablet Take 1 tablet (100 mg total) by mouth 2 (two) times daily. 2/28/23 2/28/24 Yes Ousmane Mccain II, MD   nitroGLYCERIN (NITROSTAT) 0.4 MG SL tablet Place 1 tablet (0.4 mg total) under the tongue every 5 (five) minutes as needed for Chest pain. 3/22/23  Yes Bret Rice MD   rosuvastatin (CRESTOR) 40 MG Tab Take 1 tablet (40 mg total) by mouth every evening. 2/28/23 2/28/24 Yes Ousmane Mccain II, MD   terbinafine HCL (LAMISIL) 1 % cream APPLY SMALL AMOUNT TOPICALLY TWICE A DAY FOR FUNGAL INFECTION APPLY TO FEET/TOES FOR FUNGAL INFECTION 9/15/22  Yes Provider, Historical   traZODone (DESYREL) 100 MG tablet Take 1 tablet (100 mg total) by mouth nightly as needed for Insomnia. 7/5/23   Yes Bret Rice MD   fenofibrate 160 MG Tab Take 1 tablet (160 mg total) by mouth once daily. 10/10/23 10/9/24  Miguel Angel Silverman MD         History  Past Medical History:   Diagnosis Date    Anxiety     Basal cell carcinoma     Colon polyp     Coronary artery disease     Depression     Dyslipidemia     Hypertension     NSTEMI (non-ST elevated myocardial infarction) 12/13/2022    BNP and Creat normal.  CP with elevated Troponins.  Hx of CAD and  LAD 2018.  Cardiology consulted.  Patient's chest pain dissipated after 5 hours in the hospital.  He was taken to the cath lab and cardiac catheterization was done and stent was placed he was completely pain free following this he was watched an additional 16 hours with no return of chest pain he was cleared by Cardiology for di    PTSD (post-traumatic stress disorder)     Submucosal lesion of stomach      Past Surgical History:   Procedure Laterality Date    CARDIAC CATHETERIZATION  02/27/2023    stent placed mid circ    CORONARY ANGIOGRAPHY N/A 2/27/2023    Procedure: ANGIOGRAM, CORONARY ARTERY;  Surgeon: Miguel Angel Silverman MD;  Location: [facility] CATH LAB;  Service: Interventional;  Laterality: N/A;  radial    CORONARY ANGIOPLASTY WITH STENT PLACEMENT Right 2/27/2023    Procedure: ANGIOPLASTY, CORONARY ARTERY, WITH STENT INSERTION;  Surgeon: Miguel Angel Silverman MD;  Location: [facility] CATH LAB;  Service: Interventional;  Laterality: Right;    cyst removed from neck      skin cancer removed  01/01/2000     Social History     Socioeconomic History    Marital status:    Tobacco Use    Smoking status: Former     Current packs/day: 0.25     Average packs/day: 0.3 packs/day for 2.0 years (0.5 ttl pk-yrs)     Types: Cigarettes   Substance and Sexual Activity    Alcohol use: No    Drug use: No     Social Determinants of Health     Financial Resource Strain: Low Risk  (2/26/2023)    Overall Financial Resource Strain (CARDIA)     Difficulty of Paying Living Expenses: Not very  hard   Food Insecurity: No Food Insecurity (2/26/2023)    Hunger Vital Sign     Worried About Running Out of Food in the Last Year: Never true     Ran Out of Food in the Last Year: Never true   Transportation Needs: Unknown (2/26/2023)    PRAPARE - Transportation     Lack of Transportation (Non-Medical): No   Physical Activity: Insufficiently Active (2/26/2023)    Exercise Vital Sign     Days of Exercise per Week: 4 days     Minutes of Exercise per Session: 30 min   Stress: No Stress Concern Present (2/26/2023)    [employer] [place] [employer] - Occupational Stress Questionnaire     Feeling of Stress : Only a little   Social Connections: Unknown (2/26/2023)    Social Connection and Isolation Panel [NHANES]     Frequency of Communication with Friends and Family: Twice a week     Frequency of Social Gatherings with Friends and Family: Twice a week     Attends Judaism Services: 1 to 4 times per year     Active Member of Clubs or Organizations: No     Attends Club or Organization Meetings: 1 to 4 times per year   Housing Stability: Low Risk  (2/26/2023)    Housing Stability Vital Sign     Unable to Pay for Housing in the Last Year: No     Number of Places Lived in the Last Year: 1     Unstable Housing in the Last Year: No         Allergies  Review of patient's allergies indicates:   Allergen Reactions    Atorvastatin Tinitus     Other reaction(s): Tinnitus, Insomnia         Review of Systems   Review of Systems   Constitutional: Negative for fever.   HENT:  Negative for nosebleeds.    Eyes:  Negative for visual disturbance.   Cardiovascular:  Negative for chest pain, claudication, dyspnea on exertion, palpitations and syncope.   Respiratory:  Negative for cough, hemoptysis and wheezing.    Endocrine: Negative for cold intolerance, heat intolerance, polyphagia and polyuria.   Hematologic/Lymphatic: Negative for bleeding problem.   Skin:  Negative for rash.   Musculoskeletal:  Negative for myalgias.    Gastrointestinal:  Negative for hematemesis, hematochezia, nausea and vomiting.   Genitourinary:  Negative for dysuria.   Neurological:  Negative for focal weakness and sensory change.   Psychiatric/Behavioral:  Negative for altered mental status.          Physical Exam  Wt Readings from Last 1 Encounters:   10/10/23 87.2 kg (192 lb 5.6 oz)     BP Readings from Last 3 Encounters:   10/10/23 118/72   09/22/23 130/70   04/26/23 116/80     Pulse Readings from Last 1 Encounters:   10/10/23 63     Body mass index is 31.05 kg/m².    Physical Exam  Vitals reviewed.   Constitutional:       Appearance: He is well-developed.   HENT:      Head: Atraumatic.   Eyes:      General: No scleral icterus.  Neck:      Vascular: Normal carotid pulses. No carotid bruit, hepatojugular reflux or JVD.   Cardiovascular:      Rate and Rhythm: Normal rate and regular rhythm.      Chest Wall: PMI is not displaced.      Pulses: Intact distal pulses.           Carotid pulses are 2+ on the right side and 2+ on the left side.       Radial pulses are 2+ on the right side and 2+ on the left side.        Dorsalis pedis pulses are 2+ on the right side and 2+ on the left side.      Heart sounds: Normal heart sounds, S1 normal and S2 normal. No murmur heard.     No friction rub.   Pulmonary:      Effort: Pulmonary effort is normal. No respiratory distress.      Breath sounds: Normal breath sounds. No stridor. No wheezing or rales.   Chest:      Chest wall: No tenderness.   Abdominal:      General: Bowel sounds are normal.      Palpations: Abdomen is soft.   Musculoskeletal:      Cervical back: Neck supple. No edema.   Skin:     General: Skin is warm and dry.      Nails: There is no clubbing.   Neurological:      Mental Status: He is alert and oriented to person, place, and time.   Psychiatric:         Behavior: Behavior normal.         Thought Content: Thought content normal.             Assessment  1. NSTEMI (non-ST elevated myocardial  infarction)  Stable no angina status post PCI Feb 2023    2. Hyperlipidemia, unspecified hyperlipidemia type  Not at goal  - EKG 12-lead  - Comprehensive Metabolic Panel; Future  - Lipid Panel; Future    3. Primary hypertension  Controlled on meds, continue meds and monitor    4. Atherosclerosis of native coronary artery of native heart without angina pectoris  Stable no angina        Plan and Discussion  Discussed that his EKG today shows sinus rhythm rate of 63 with first-degree AV block.  Discussed that his lipid profile is not at goal.  Recommend to follow cardiac diet.  Will start fenofibrate 154 mg once daily continue the rest of the medications.  Recommend to walk at least 30 minutes per day for exercise.  Will recheck labs in 3 months.    Follow Up  3 months      Miguel Angel Silverman MD, F.A.C.C, F.S.C.A.I.        30 minutes were spent in chart review, documentation and review of results, and evaluation, treatment, and counseling of patient on the same day of service.    Disclaimer: This document was created using voice recognition software ([employer] Direct). Although it may be edited, this document may contain errors related to incorrect recognition of the spoken word. Please call the physician if clarification is needed.

## 2024-01-10 ENCOUNTER — OFFICE VISIT (OUTPATIENT)
Dept: CARDIOLOGY | Facility: CLINIC | Age: 74
End: 2024-01-10
Payer: MEDICARE

## 2024-01-10 VITALS
BODY MASS INDEX: 31.05 KG/M2 | WEIGHT: 192.38 LBS | HEART RATE: 64 BPM | SYSTOLIC BLOOD PRESSURE: 118 MMHG | OXYGEN SATURATION: 97 % | DIASTOLIC BLOOD PRESSURE: 68 MMHG

## 2024-01-10 DIAGNOSIS — E78.5 HYPERLIPIDEMIA, UNSPECIFIED HYPERLIPIDEMIA TYPE: ICD-10-CM

## 2024-01-10 DIAGNOSIS — I10 PRIMARY HYPERTENSION: Primary | ICD-10-CM

## 2024-01-10 DIAGNOSIS — I25.10 ATHEROSCLEROSIS OF NATIVE CORONARY ARTERY OF NATIVE HEART WITHOUT ANGINA PECTORIS: ICD-10-CM

## 2024-01-10 PROCEDURE — 99214 OFFICE O/P EST MOD 30 MIN: CPT | Mod: S$GLB,,, | Performed by: INTERNAL MEDICINE

## 2024-01-10 PROCEDURE — 3078F DIAST BP <80 MM HG: CPT | Mod: CPTII,S$GLB,, | Performed by: INTERNAL MEDICINE

## 2024-01-10 PROCEDURE — 1126F AMNT PAIN NOTED NONE PRSNT: CPT | Mod: CPTII,S$GLB,, | Performed by: INTERNAL MEDICINE

## 2024-01-10 PROCEDURE — 99999 PR PBB SHADOW E&M-EST. PATIENT-LVL III: CPT | Mod: PBBFAC,,, | Performed by: INTERNAL MEDICINE

## 2024-01-10 PROCEDURE — 3008F BODY MASS INDEX DOCD: CPT | Mod: CPTII,S$GLB,, | Performed by: INTERNAL MEDICINE

## 2024-01-10 PROCEDURE — 93000 ELECTROCARDIOGRAM COMPLETE: CPT | Mod: S$GLB,,, | Performed by: INTERNAL MEDICINE

## 2024-01-10 PROCEDURE — 1101F PT FALLS ASSESS-DOCD LE1/YR: CPT | Mod: CPTII,S$GLB,, | Performed by: INTERNAL MEDICINE

## 2024-01-10 PROCEDURE — 3288F FALL RISK ASSESSMENT DOCD: CPT | Mod: CPTII,S$GLB,, | Performed by: INTERNAL MEDICINE

## 2024-01-10 PROCEDURE — 3074F SYST BP LT 130 MM HG: CPT | Mod: CPTII,S$GLB,, | Performed by: INTERNAL MEDICINE

## 2024-01-10 PROCEDURE — 1159F MED LIST DOCD IN RCRD: CPT | Mod: CPTII,S$GLB,, | Performed by: INTERNAL MEDICINE

## 2024-01-10 NOTE — PROGRESS NOTES
Cardiology    1/10/2024  9:35 AM    Problem list  Patient Active Problem List   Diagnosis    Sensorineural hearing loss    Tinnitus, subjective    Atherosclerosis of native coronary artery of native heart without angina pectoris    Dry eye syndrome of bilateral lacrimal glands    Primary hypertension    Hyperlipidemia    Prediabetes    Radiculopathy, cervical region    History of colon polyps    Thrombocytopenia, unspecified       CC:  Follow-up    HPI:  He has been doing well.  He denies any angina, dyspnea on exertion, palpitation or syncope.  He denies any bleeding.  He is compliant with his medication.  Since his last visit, he has been watching his diet more carefully and as result, his cholesterol profile has improved.    Medications  Current Outpatient Medications   Medication Sig Dispense Refill    artificial tears,hypromellose,,GENTEAL/SUSTANE, 0.3 % Gel APPLY TO EACH EYE AT BEDTIME      aspirin (ECOTRIN) 81 MG EC tablet Take 81 mg by mouth once daily.        b complex vitamins tablet Take 1 tablet by mouth once daily.      carboxymethylcellulose (REFRESH PLUS) 0.5 % Dpet INSTILL 2 DROP(S) IN EACH EYE SIX TIMES DAILY AS NEEDED FOR DRY EYES      cholecalciferol, vitamin D3, (VITAMIN D3) 50 mcg (2,000 unit) Tab TAKE ONE TABLET BY MOUTH EVERY DAY AS A VITAMIN SUPPLEMENT      clopidogreL (PLAVIX) 75 mg tablet Take 1 tablet (75 mg total) by mouth once daily. 90 tablet 1    fenofibrate 160 MG Tab Take 1 tablet (160 mg total) by mouth once daily. 90 tablet 3    ketoconazole (NIZORAL) 2 % shampoo Apply topically twice a week.        metoprolol succinate (TOPROL-XL) 100 MG 24 hr tablet Take 1 tablet (100 mg total) by mouth 2 (two) times daily. 60 tablet 11    nitroGLYCERIN (NITROSTAT) 0.4 MG SL tablet Place 1 tablet (0.4 mg total) under the tongue every 5 (five) minutes as needed for Chest pain. 25 tablet 1    rosuvastatin (CRESTOR) 40 MG Tab Take 1 tablet (40 mg total) by mouth every evening. 90 tablet 3     terbinafine HCL (LAMISIL) 1 % cream APPLY SMALL AMOUNT TOPICALLY TWICE A DAY FOR FUNGAL INFECTION APPLY TO FEET/TOES FOR FUNGAL INFECTION      traZODone (DESYREL) 100 MG tablet Take 1 tablet (100 mg total) by mouth nightly as needed for Insomnia. 90 tablet 1     No current facility-administered medications for this visit.      Prior to Admission medications    Medication Sig Start Date End Date Taking? Authorizing Provider   artificial tears,hypromellose,,GENTEAL/SUSTANE, 0.3 % Gel APPLY TO EACH EYE AT BEDTIME 9/15/22  Yes Provider, Historical   aspirin (ECOTRIN) 81 MG EC tablet Take 81 mg by mouth once daily.     Yes Provider, Historical   b complex vitamins tablet Take 1 tablet by mouth once daily. 9/15/22  Yes Provider, Historical   carboxymethylcellulose (REFRESH PLUS) 0.5 % Dpet INSTILL 2 DROP(S) IN EACH EYE SIX TIMES DAILY AS NEEDED FOR DRY EYES 9/15/22  Yes Provider, Historical   cholecalciferol, vitamin D3, (VITAMIN D3) 50 mcg (2,000 unit) Tab TAKE ONE TABLET BY MOUTH EVERY DAY AS A VITAMIN SUPPLEMENT 9/14/22  Yes Provider, Historical   clopidogreL (PLAVIX) 75 mg tablet Take 1 tablet (75 mg total) by mouth once daily. 7/24/23  Yes Katharine Penny NP   fenofibrate 160 MG Tab Take 1 tablet (160 mg total) by mouth once daily. 10/10/23 10/9/24 Yes Miguel Angel Silverman MD   ketoconazole (NIZORAL) 2 % shampoo Apply topically twice a week.     Yes Provider, Historical   metoprolol succinate (TOPROL-XL) 100 MG 24 hr tablet Take 1 tablet (100 mg total) by mouth 2 (two) times daily. 2/28/23 2/28/24 Yes Ousmane Mccain II, MD   nitroGLYCERIN (NITROSTAT) 0.4 MG SL tablet Place 1 tablet (0.4 mg total) under the tongue every 5 (five) minutes as needed for Chest pain. 3/22/23  Yes Bret Rice MD   rosuvastatin (CRESTOR) 40 MG Tab Take 1 tablet (40 mg total) by mouth every evening. 2/28/23 2/28/24 Yes Ousmane Mccain II, MD   terbinafine HCL (LAMISIL) 1 % cream APPLY SMALL AMOUNT TOPICALLY TWICE A DAY FOR  FUNGAL INFECTION APPLY TO FEET/TOES FOR FUNGAL INFECTION 9/15/22  Yes Provider, Historical   traZODone (DESYREL) 100 MG tablet Take 1 tablet (100 mg total) by mouth nightly as needed for Insomnia. 7/5/23  Yes Bret Rice MD         History  Past Medical History:   Diagnosis Date    Anxiety     Basal cell carcinoma     Colon polyp     Coronary artery disease     Depression     Dyslipidemia     Hypertension     NSTEMI (non-ST elevated myocardial infarction) 12/13/2022    BNP and Creat normal.  CP with elevated Troponins.  Hx of CAD and  LAD 2018.  Cardiology consulted.  Patient's chest pain dissipated after 5 hours in the hospital.  He was taken to the cath lab and cardiac catheterization was done and stent was placed he was completely pain free following this he was watched an additional 16 hours with no return of chest pain he was cleared by Cardiology for di    PTSD (post-traumatic stress disorder)     Submucosal lesion of stomach      Past Surgical History:   Procedure Laterality Date    CARDIAC CATHETERIZATION  02/27/2023    stent placed mid circ    CORONARY ANGIOGRAPHY N/A 2/27/2023    Procedure: ANGIOGRAM, CORONARY ARTERY;  Surgeon: Miguel Angel Silverman MD;  Location: [facility] CATH LAB;  Service: Interventional;  Laterality: N/A;  radial    CORONARY ANGIOPLASTY WITH STENT PLACEMENT Right 2/27/2023    Procedure: ANGIOPLASTY, CORONARY ARTERY, WITH STENT INSERTION;  Surgeon: Miguel Angel Silverman MD;  Location: [facility] CATH LAB;  Service: Interventional;  Laterality: Right;    cyst removed from neck      skin cancer removed  01/01/2000     Social History     Socioeconomic History    Marital status:    Tobacco Use    Smoking status: Former     Current packs/day: 0.25     Average packs/day: 0.3 packs/day for 2.0 years (0.5 ttl pk-yrs)     Types: Cigarettes   Substance and Sexual Activity    Alcohol use: No    Drug use: No     Social Determinants of Health     Financial Resource Strain: Low Risk  (2/26/2023)     Overall Financial Resource Strain (CARDIA)     Difficulty of Paying Living Expenses: Not very hard   Food Insecurity: No Food Insecurity (2/26/2023)    Hunger Vital Sign     Worried About Running Out of Food in the Last Year: Never true     Ran Out of Food in the Last Year: Never true   Transportation Needs: Unknown (2/26/2023)    PRAPARE - Transportation     Lack of Transportation (Non-Medical): No   Physical Activity: Insufficiently Active (2/26/2023)    Exercise Vital Sign     Days of Exercise per Week: 4 days     Minutes of Exercise per Session: 30 min   Stress: No Stress Concern Present (2/26/2023)    [employer] [place] [employer] - Occupational Stress Questionnaire     Feeling of Stress : Only a little   Social Connections: Unknown (2/26/2023)    Social Connection and Isolation Panel [NHANES]     Frequency of Communication with Friends and Family: Twice a week     Frequency of Social Gatherings with Friends and Family: Twice a week     Attends Muslim Services: 1 to 4 times per year     Active Member of Clubs or Organizations: No     Attends Club or Organization Meetings: 1 to 4 times per year   Housing Stability: Low Risk  (2/26/2023)    Housing Stability Vital Sign     Unable to Pay for Housing in the Last Year: No     Number of Places Lived in the Last Year: 1     Unstable Housing in the Last Year: No         Allergies  Review of patient's allergies indicates:   Allergen Reactions    Atorvastatin Tinitus     Other reaction(s): Tinnitus, Insomnia         Review of Systems   Review of Systems   Constitutional: Negative for fever.   HENT:  Negative for nosebleeds.    Eyes:  Negative for visual disturbance.   Cardiovascular:  Negative for chest pain, claudication, dyspnea on exertion, palpitations and syncope.   Respiratory:  Negative for cough, hemoptysis and wheezing.    Endocrine: Negative for cold intolerance, heat intolerance, polyphagia and polyuria.   Hematologic/Lymphatic: Negative for  bleeding problem.   Skin:  Negative for rash.   Musculoskeletal:  Negative for myalgias.   Gastrointestinal:  Negative for hematemesis, hematochezia, nausea and vomiting.   Genitourinary:  Negative for dysuria.   Neurological:  Negative for focal weakness and sensory change.   Psychiatric/Behavioral:  Negative for altered mental status.          Physical Exam  Wt Readings from Last 1 Encounters:   01/10/24 87.2 kg (192 lb 5.6 oz)     BP Readings from Last 3 Encounters:   01/10/24 118/68   10/10/23 118/72   09/22/23 130/70     Pulse Readings from Last 1 Encounters:   01/10/24 64     Body mass index is 31.05 kg/m².    Physical Exam  Vitals reviewed.   Constitutional:       Appearance: He is well-developed.   HENT:      Head: Atraumatic.   Eyes:      General: No scleral icterus.  Neck:      Vascular: Normal carotid pulses. No carotid bruit, hepatojugular reflux or JVD.   Cardiovascular:      Rate and Rhythm: Normal rate and regular rhythm.      Chest Wall: PMI is not displaced.      Pulses: Intact distal pulses.           Carotid pulses are 2+ on the right side and 2+ on the left side.       Radial pulses are 2+ on the right side and 2+ on the left side.        Dorsalis pedis pulses are 2+ on the right side and 2+ on the left side.      Heart sounds: Normal heart sounds, S1 normal and S2 normal. No murmur heard.     No friction rub.   Pulmonary:      Effort: Pulmonary effort is normal. No respiratory distress.      Breath sounds: Normal breath sounds. No stridor. No wheezing or rales.   Chest:      Chest wall: No tenderness.   Abdominal:      General: Bowel sounds are normal.      Palpations: Abdomen is soft.   Musculoskeletal:      Cervical back: Neck supple. No edema.   Skin:     General: Skin is warm and dry.      Nails: There is no clubbing.   Neurological:      Mental Status: He is alert and oriented to person, place, and time.   Psychiatric:         Behavior: Behavior normal.         Thought Content: Thought  content normal.             Assessment  1. Primary hypertension  Controlled on current medications    2. Hyperlipidemia, unspecified hyperlipidemia type  Improved, LDL at goal at 69 on statin    3. Atherosclerosis of native coronary artery of native heart without angina pectoris  Stable no angina  - EKG 12-lead  - CBC Without Differential; Future  - Lipid Panel; Future  - Comprehensive Metabolic Panel; Future        Plan and Discussion  Reviewed and discussed his labs which showed improvement in his LDL given his compliance with low-fat diet.  Discussed that he should continue aspirin 81 mg daily indefinitely unless he has bleeding complications.  He may stop clopidogrel on February 27, 2024 since it will be 1 year from his PCI.    Follow Up  Six months with labs      Miguel Angel Silverman MD, F.A.C.C, F.S.C.A.I.        30 minutes were spent in chart review, documentation and review of results, and evaluation, treatment, and counseling of patient on the same day of service.    Disclaimer: This document was created using voice recognition software (.[employer] Direct). Although it may be edited, this document may contain errors related to incorrect recognition of the spoken word. Please call the physician if clarification is needed.

## 2024-02-02 RX ORDER — CLOPIDOGREL BISULFATE 75 MG/1
75 TABLET ORAL
Qty: 90 TABLET | Refills: 3 | Status: SHIPPED | OUTPATIENT
Start: 2024-02-02 | End: 2024-03-25

## 2024-02-02 NOTE — TELEPHONE ENCOUNTER
Refill Routing Note   Medication(s) are not appropriate for processing by [facility] for the following reason(s):        No active prescription written by provider    [facility] action(s):  Defer               Appointments  past 12m or future 3m with PCP    Date Provider   Last Visit   9/22/2023 Bret Rice MD   Next Visit   3/25/2024 Bret Rice MD   ED visits in past 90 days: 0        Note composed:12:11 PM 02/02/2024

## 2024-02-09 ENCOUNTER — TELEPHONE (OUTPATIENT)
Dept: PRIMARY CARE CLINIC | Facility: CLINIC | Age: 74
End: 2024-02-09
Payer: MEDICARE

## 2024-02-09 NOTE — TELEPHONE ENCOUNTER
Called pt regarding message. Pt called to inform staff [facility] was seen [facility] for [facility]. Pt has tested positive. Pt stated he was prescribed medication and is starting to feel better. Verbalized understanding.

## 2024-02-09 NOTE — TELEPHONE ENCOUNTER
----- Message from Adriana Dozier sent at 2/9/2024 12:42 PM CST -----  Contact: Pt [phone]  Patient is calling to let you know that he was treated at [facility] for Covid, via IV.    Please call and advise.    Thank You

## 2024-03-08 NOTE — TELEPHONE ENCOUNTER
----- Message from Kanika Mayes sent at 3/8/2024  2:39 PM CST -----  Contact: Patient, [phone]  Requesting an RX refill or new RX.  Is this a refill or new RX: Refill  RX name and strength (copy/paste from chart):  rosuvastatin (CRESTOR) 40 MG Tab  Is this a 30 day or 90 day RX:   Pharmacy name and phone # (copy/paste from chart):    [address] [employer] [employer]  [address] [facility] [address]  Phone: [phone] Fax: [phone]  The doctors have asked that we provide their patients with the following 2 reminders -- prescription refills can take up to 72 hours, and a friendly reminder that in the future you can use your [facility] account to request refills: Yes

## 2024-03-08 NOTE — TELEPHONE ENCOUNTER
No care due was identified.  [facility] Embedded Care Due Messages. Reference number: [id number].   3/08/2024 2:52:18 PM CST

## 2024-03-09 NOTE — TELEPHONE ENCOUNTER
Refill Routing Note   Medication(s) are not appropriate for processing by [facility] for the following reason(s):        Allergy or intolerance  No active prescription written by provider    [facility] action(s):  Defer        Medication Therapy Plan: Allergy/Contraindication: Atorvastatin Reactions: Tinitus      Appointments  past 12m or future 3m with PCP    Date Provider   Last Visit   9/22/2023 Bret Rice MD   Next Visit   3/25/2024 Bret Rice MD   ED visits in past 90 days: 0        Note composed:6:12 PM 03/08/2024

## 2024-03-11 RX ORDER — ROSUVASTATIN CALCIUM 40 MG/1
40 TABLET, COATED ORAL NIGHTLY
Qty: 90 TABLET | Refills: 3 | Status: SHIPPED | OUTPATIENT
Start: 2024-03-11 | End: 2025-03-06

## 2024-03-25 ENCOUNTER — OFFICE VISIT (OUTPATIENT)
Dept: PRIMARY CARE CLINIC | Facility: CLINIC | Age: 74
End: 2024-03-25
Payer: MEDICARE

## 2024-03-25 VITALS
BODY MASS INDEX: 30.88 KG/M2 | WEIGHT: 192.13 LBS | HEART RATE: 81 BPM | RESPIRATION RATE: 18 BRPM | TEMPERATURE: 98 F | DIASTOLIC BLOOD PRESSURE: 70 MMHG | OXYGEN SATURATION: 98 % | SYSTOLIC BLOOD PRESSURE: 126 MMHG | HEIGHT: 66 IN

## 2024-03-25 DIAGNOSIS — D69.6 THROMBOCYTOPENIA, UNSPECIFIED: ICD-10-CM

## 2024-03-25 DIAGNOSIS — I25.10 ATHEROSCLEROSIS OF NATIVE CORONARY ARTERY OF NATIVE HEART WITHOUT ANGINA PECTORIS: ICD-10-CM

## 2024-03-25 DIAGNOSIS — I10 PRIMARY HYPERTENSION: Primary | ICD-10-CM

## 2024-03-25 DIAGNOSIS — R73.03 PREDIABETES: ICD-10-CM

## 2024-03-25 DIAGNOSIS — E78.5 HYPERLIPIDEMIA, UNSPECIFIED HYPERLIPIDEMIA TYPE: ICD-10-CM

## 2024-03-25 PROCEDURE — 1159F MED LIST DOCD IN RCRD: CPT | Mod: CPTII,S$GLB,, | Performed by: FAMILY MEDICINE

## 2024-03-25 PROCEDURE — 3074F SYST BP LT 130 MM HG: CPT | Mod: CPTII,S$GLB,, | Performed by: FAMILY MEDICINE

## 2024-03-25 PROCEDURE — 99214 OFFICE O/P EST MOD 30 MIN: CPT | Mod: S$GLB,,, | Performed by: FAMILY MEDICINE

## 2024-03-25 PROCEDURE — 99999 PR PBB SHADOW E&M-EST. PATIENT-LVL IV: CPT | Mod: PBBFAC,,, | Performed by: FAMILY MEDICINE

## 2024-03-25 PROCEDURE — 3008F BODY MASS INDEX DOCD: CPT | Mod: CPTII,S$GLB,, | Performed by: FAMILY MEDICINE

## 2024-03-25 PROCEDURE — 1126F AMNT PAIN NOTED NONE PRSNT: CPT | Mod: CPTII,S$GLB,, | Performed by: FAMILY MEDICINE

## 2024-03-25 PROCEDURE — 1160F RVW MEDS BY RX/DR IN RCRD: CPT | Mod: CPTII,S$GLB,, | Performed by: FAMILY MEDICINE

## 2024-03-25 PROCEDURE — 1101F PT FALLS ASSESS-DOCD LE1/YR: CPT | Mod: CPTII,S$GLB,, | Performed by: FAMILY MEDICINE

## 2024-03-25 PROCEDURE — 3288F FALL RISK ASSESSMENT DOCD: CPT | Mod: CPTII,S$GLB,, | Performed by: FAMILY MEDICINE

## 2024-03-25 PROCEDURE — 3078F DIAST BP <80 MM HG: CPT | Mod: CPTII,S$GLB,, | Performed by: FAMILY MEDICINE

## 2024-03-25 NOTE — PROGRESS NOTES
"Subjective:       Patient ID: Sammy Kam is a 73 y.o. male.    Chief Complaint: Follow-up (6 month )    Sammy Kam is a 73 y.o. male seen today for a routine checkup. The patient has no specific complaints or concerns at this time.  No recent illness or injury.  Compliant with all prescribed medications without adverse side effects. [facility] generally [facility]/stable      Review of Systems   Respiratory:  Negative for shortness of breath.    Cardiovascular:  Negative for chest pain and palpitations.   Gastrointestinal:  Negative for blood in stool.   Genitourinary:  Negative for difficulty urinating.   Neurological:  Negative for dizziness.   Hematological:  Does not bruise/bleed easily.       Objective:      Vitals:    03/25/24 1108   BP: 126/70   BP Location: Right arm   Patient Position: Sitting   BP Method: Medium (Manual)   Pulse: 81   Resp: 18   Temp: 97.7 °F (36.5 °C)   TempSrc: Temporal   SpO2: 98%   Weight: 87.1 kg (192 lb 2.1 oz)   Height: 5' 6" (1.676 m)     BP Readings from Last 5 Encounters:   03/25/24 126/70   01/10/24 118/68   10/10/23 118/72   09/22/23 130/70   04/26/23 116/80     Wt Readings from Last 5 Encounters:   03/25/24 87.1 kg (192 lb 2.1 oz)   01/10/24 87.2 kg (192 lb 5.6 oz)   10/10/23 87.2 kg (192 lb 5.6 oz)   09/22/23 86.4 kg (190 lb 7.6 oz)   04/26/23 87.8 kg (193 lb 7.3 oz)     Physical Exam  Vitals and nursing note reviewed.   Constitutional:       General: He is not in acute distress.     Appearance: Normal appearance. He is well-developed.   HENT:      Head: Normocephalic and atraumatic.   Cardiovascular:      Rate and Rhythm: Normal rate and regular rhythm.      Heart sounds: Normal heart sounds.   Pulmonary:      Effort: Pulmonary effort is normal.      Breath sounds: Normal breath sounds.   Musculoskeletal:      Right lower leg: No edema.      Left lower leg: No edema.   Skin:     General: Skin is warm and dry.   Neurological:      Mental Status: " He is alert and oriented to person, place, and time.   Psychiatric:         Mood and Affect: Mood normal.         Behavior: Behavior normal.         Lab Results   Component Value Date    WBC 4.93 09/22/2023    HGB 13.8 (L) 09/22/2023    HCT 42.7 09/22/2023     (L) 09/22/2023    CHOL 134 01/06/2024    TRIG 153 (H) 01/06/2024    HDL 34 (L) 01/06/2024    ALT 20 01/06/2024    AST 24 01/06/2024     01/06/2024    K 4.5 01/06/2024     01/06/2024    CREATININE 1.2 01/06/2024    BUN 13 01/06/2024    CO2 26 01/06/2024    PSA 2.1 12/14/2022    INR 1.0 02/25/2023    HGBA1C 5.7 (H) 09/22/2023      Assessment:       1. Primary hypertension    2. Thrombocytopenia, unspecified    3. Atherosclerosis of native coronary artery of native heart without angina pectoris    4. Hyperlipidemia, unspecified hyperlipidemia type    5. Prediabetes        Plan:       Primary hypertension  Well controlled on current regimen  Thrombocytopenia, unspecified  Chronic, asymptomatic  Atherosclerosis of native coronary artery of native heart without angina pectoris  Medical management  Hyperlipidemia, unspecified hyperlipidemia type  Continue current meds  Prediabetes  Excellent glycemic control    Medication List with Changes/Refills   Current Medications    ARTIFICIAL TEARS,HYPROMELLOSE,,GENTEAL/SUSTANE, 0.3 % GEL    APPLY TO EACH EYE AT BEDTIME    ASPIRIN (ECOTRIN) 81 MG EC TABLET    Take 81 mg by mouth once daily.      B COMPLEX VITAMINS TABLET    Take 1 tablet by mouth once daily.    CARBOXYMETHYLCELLULOSE (REFRESH PLUS) 0.5 % DPET    INSTILL 2 DROP(S) IN EACH EYE SIX TIMES DAILY AS NEEDED FOR DRY EYES    CHOLECALCIFEROL, VITAMIN D3, (VITAMIN D3) 50 MCG (2,000 UNIT) TAB    TAKE ONE TABLET BY MOUTH EVERY DAY AS A VITAMIN SUPPLEMENT    FENOFIBRATE 160 MG TAB    Take 1 tablet (160 mg total) by mouth once daily.    KETOCONAZOLE (NIZORAL) 2 % SHAMPOO    Apply topically twice a week.      METOPROLOL SUCCINATE (TOPROL-XL) 100 MG 24 HR  TABLET    Take 1 tablet (100 mg total) by mouth 2 (two) times daily.    NITROGLYCERIN (NITROSTAT) 0.4 MG SL TABLET    Place 1 tablet (0.4 mg total) under the tongue every 5 (five) minutes as needed for Chest pain.    ROSUVASTATIN (CRESTOR) 40 MG TAB    Take 1 tablet (40 mg total) by mouth every evening.    TERBINAFINE HCL (LAMISIL) 1 % CREAM    APPLY SMALL AMOUNT TOPICALLY TWICE A DAY FOR FUNGAL INFECTION APPLY TO FEET/TOES FOR FUNGAL INFECTION    TRAZODONE (DESYREL) 100 MG TABLET    Take 1 tablet (100 mg total) by mouth nightly as needed for Insomnia.   Discontinued Medications    CLOPIDOGREL (PLAVIX) 75 MG TABLET    Take 1 tablet by mouth once daily

## 2024-03-28 ENCOUNTER — PATIENT OUTREACH (OUTPATIENT)
Dept: ADMINISTRATIVE | Facility: HOSPITAL | Age: 74
End: 2024-03-28
Payer: MEDICARE

## 2024-03-28 DIAGNOSIS — R73.03 PREDIABETES: Primary | ICD-10-CM

## 2024-03-28 NOTE — PROGRESS NOTES
Health Maintenance Due   Topic Date Due    Colorectal Cancer Screening  Never done    RSV Vaccine (Age 60+ and Pregnant patients) (1 - 1-dose 60+ series) Never done    COVID-19 Vaccine (6 - 2023-24 season) 09/01/2023     Immunizations - reviewed and updated   Care Everywhere - triggered   Care Teams - updated  Outreach - DAISY sent to [facility] for most recent colonoscopy records, fax # [phone]  Order placed for Hemoglobin A1C, lab appointment scheduled for 7/12/2024, order linked

## 2024-03-28 NOTE — LETTER
AUTHORIZATION FOR RELEASE OF   CONFIDENTIAL INFORMATION    Dear Medical Records,    We are seeing Sammy Kam, date of birth 1950, in the clinic at [facility]. Bret Rice MD is the patient's PCP. Sammy Kam has an outstanding lab/procedure at the time we reviewed his chart. In order to help keep his health information updated, he has authorized us to request the following medical record(s):        (  )  MAMMOGRAM                                      (X)  COLONOSCOPY      (  )  PAP SMEAR                                          (  )  OUTSIDE LAB RESULTS     (  )  DEXA SCAN                                          (  )  EYE EXAM            (  )  FOOT EXAM                                          (  )  ENTIRE RECORD     (  )  OUTSIDE IMMUNIZATIONS                 (  )  _______________       ATTN: RAISA      Please fax records to Bret Rice MD, [phone]     If you have any questions, please contact Raisa at [phone].           Patient Name: Sammy Kam  : 1950  Patient Phone #: [phone]

## 2024-03-28 NOTE — LETTER
AUTHORIZATION FOR RELEASE OF   CONFIDENTIAL INFORMATION    Dear [facility],    We are seeing Sammy Kam, date of birth 1950, in the clinic at [facility]. Bret Rice MD is the patient's PCP. Sammy Kam has an outstanding lab/procedure at the time we reviewed his chart. In order to help keep his health information updated, he has authorized us to request the following medical record(s):        (  )  MAMMOGRAM                                      (X)  COLONOSCOPY      (  )  PAP SMEAR                                          (  )  OUTSIDE LAB RESULTS     (  )  DEXA SCAN                                          (  )  EYE EXAM            (  )  FOOT EXAM                                          (  )  ENTIRE RECORD     (  )  OUTSIDE IMMUNIZATIONS                 (  )  _______________       ATTN: RAISA     Please fax records to Bret Rice MD, [phone]     If you have any questions, please contact Raisa at [phone].           Patient Name: Sammy Kam  : 1950  Patient Phone #: [phone]

## 2024-07-16 ENCOUNTER — OFFICE VISIT (OUTPATIENT)
Dept: CARDIOLOGY | Facility: CLINIC | Age: 74
End: 2024-07-16
Payer: MEDICARE

## 2024-07-16 VITALS
DIASTOLIC BLOOD PRESSURE: 70 MMHG | WEIGHT: 192.25 LBS | SYSTOLIC BLOOD PRESSURE: 118 MMHG | BODY MASS INDEX: 31.03 KG/M2 | OXYGEN SATURATION: 97 % | HEART RATE: 59 BPM

## 2024-07-16 DIAGNOSIS — I25.10 ATHEROSCLEROSIS OF NATIVE CORONARY ARTERY OF NATIVE HEART WITHOUT ANGINA PECTORIS: Primary | ICD-10-CM

## 2024-07-16 DIAGNOSIS — E78.5 HYPERLIPIDEMIA LDL GOAL <70: ICD-10-CM

## 2024-07-16 DIAGNOSIS — I10 PRIMARY HYPERTENSION: ICD-10-CM

## 2024-07-16 PROCEDURE — 1101F PT FALLS ASSESS-DOCD LE1/YR: CPT | Mod: CPTII,S$GLB,, | Performed by: INTERNAL MEDICINE

## 2024-07-16 PROCEDURE — 99999 PR PBB SHADOW E&M-EST. PATIENT-LVL III: CPT | Mod: PBBFAC,,, | Performed by: INTERNAL MEDICINE

## 2024-07-16 PROCEDURE — 1126F AMNT PAIN NOTED NONE PRSNT: CPT | Mod: CPTII,S$GLB,, | Performed by: INTERNAL MEDICINE

## 2024-07-16 PROCEDURE — 3074F SYST BP LT 130 MM HG: CPT | Mod: CPTII,S$GLB,, | Performed by: INTERNAL MEDICINE

## 2024-07-16 PROCEDURE — 3044F HG A1C LEVEL LT 7.0%: CPT | Mod: CPTII,S$GLB,, | Performed by: INTERNAL MEDICINE

## 2024-07-16 PROCEDURE — 1159F MED LIST DOCD IN RCRD: CPT | Mod: CPTII,S$GLB,, | Performed by: INTERNAL MEDICINE

## 2024-07-16 PROCEDURE — 3078F DIAST BP <80 MM HG: CPT | Mod: CPTII,S$GLB,, | Performed by: INTERNAL MEDICINE

## 2024-07-16 PROCEDURE — 99214 OFFICE O/P EST MOD 30 MIN: CPT | Mod: S$GLB,,, | Performed by: INTERNAL MEDICINE

## 2024-07-16 PROCEDURE — 3288F FALL RISK ASSESSMENT DOCD: CPT | Mod: CPTII,S$GLB,, | Performed by: INTERNAL MEDICINE

## 2024-07-16 NOTE — PROGRESS NOTES
Cardiology    7/16/2024  9:06 AM    Problem list  Patient Active Problem List   Diagnosis    Sensorineural hearing loss    Tinnitus, subjective    Atherosclerosis of native coronary artery of native heart without angina pectoris    Dry eye syndrome of bilateral lacrimal glands    Primary hypertension    Hyperlipidemia LDL goal <70    Prediabetes    Radiculopathy, cervical region    History of colon polyps    Thrombocytopenia, unspecified       CC:  F/u    HPI:  Patient is here for six-month follow-up.  He has been doing well.  He denies any angina.  He completed his Plavix in February after taking it for 1 year.  He remains on aspirin.  He had labs done last week and his LDL was 62.    Medications  Current Outpatient Medications   Medication Sig Dispense Refill    artificial tears,hypromellose,,GENTEAL/SUSTANE, 0.3 % Gel APPLY TO EACH EYE AT BEDTIME      aspirin (ECOTRIN) 81 MG EC tablet Take 81 mg by mouth once daily.        b complex vitamins tablet Take 1 tablet by mouth once daily.      carboxymethylcellulose (REFRESH PLUS) 0.5 % Dpet INSTILL 2 DROP(S) IN EACH EYE SIX TIMES DAILY AS NEEDED FOR DRY EYES      cholecalciferol, vitamin D3, (VITAMIN D3) 50 mcg (2,000 unit) Tab TAKE ONE TABLET BY MOUTH EVERY DAY AS A VITAMIN SUPPLEMENT      fenofibrate 160 MG Tab Take 1 tablet (160 mg total) by mouth once daily. 90 tablet 3    ketoconazole (NIZORAL) 2 % shampoo Apply topically twice a week.        nitroGLYCERIN (NITROSTAT) 0.4 MG SL tablet Place 1 tablet (0.4 mg total) under the tongue every 5 (five) minutes as needed for Chest pain. 25 tablet 1    rosuvastatin (CRESTOR) 40 MG Tab Take 1 tablet (40 mg total) by mouth every evening. 90 tablet 3    terbinafine HCL (LAMISIL) 1 % cream APPLY SMALL AMOUNT TOPICALLY TWICE A DAY FOR FUNGAL INFECTION APPLY TO FEET/TOES FOR FUNGAL INFECTION      metoprolol succinate (TOPROL-XL) 100 MG 24 hr tablet Take 1 tablet (100 mg total) by mouth 2 (two) times daily. 60 tablet 11     traZODone (DESYREL) 100 MG tablet Take 1 tablet (100 mg total) by mouth nightly as needed for Insomnia. (Patient not taking: Reported on 7/16/2024) 90 tablet 1     No current facility-administered medications for this visit.      Prior to Admission medications    Medication Sig Start Date End Date Taking? Authorizing Provider   artificial tears,hypromellose,,GENTEAL/SUSTANE, 0.3 % Gel APPLY TO EACH EYE AT BEDTIME 9/15/22  Yes Provider, Historical   aspirin (ECOTRIN) 81 MG EC tablet Take 81 mg by mouth once daily.     Yes Provider, Historical   b complex vitamins tablet Take 1 tablet by mouth once daily. 9/15/22  Yes Provider, Historical   carboxymethylcellulose (REFRESH PLUS) 0.5 % Dpet INSTILL 2 DROP(S) IN EACH EYE SIX TIMES DAILY AS NEEDED FOR DRY EYES 9/15/22  Yes Provider, Historical   cholecalciferol, vitamin D3, (VITAMIN D3) 50 mcg (2,000 unit) Tab TAKE ONE TABLET BY MOUTH EVERY DAY AS A VITAMIN SUPPLEMENT 9/14/22  Yes Provider, Historical   fenofibrate 160 MG Tab Take 1 tablet (160 mg total) by mouth once daily. 10/10/23 10/9/24 Yes Miguel Angel Silverman MD   ketoconazole (NIZORAL) 2 % shampoo Apply topically twice a week.     Yes Provider, Historical   nitroGLYCERIN (NITROSTAT) 0.4 MG SL tablet Place 1 tablet (0.4 mg total) under the tongue every 5 (five) minutes as needed for Chest pain. 3/22/23  Yes Bret Rice MD   rosuvastatin (CRESTOR) 40 MG Tab Take 1 tablet (40 mg total) by mouth every evening. 3/11/24 3/6/25 Yes Bret Rice MD   terbinafine HCL (LAMISIL) 1 % cream APPLY SMALL AMOUNT TOPICALLY TWICE A DAY FOR FUNGAL INFECTION APPLY TO FEET/TOES FOR FUNGAL INFECTION 9/15/22  Yes Provider, Historical   metoprolol succinate (TOPROL-XL) 100 MG 24 hr tablet Take 1 tablet (100 mg total) by mouth 2 (two) times daily. 2/28/23 3/25/24  Ousmane Mccain II, MD   traZODone (DESYREL) 100 MG tablet Take 1 tablet (100 mg total) by mouth nightly as needed for Insomnia.  Patient not taking: Reported on  7/16/2024 7/5/23   Bret Rice MD         History  Past Medical History:   Diagnosis Date    Anxiety     Basal cell carcinoma     Colon polyp     Coronary artery disease     Depression     Dyslipidemia     Hypertension     NSTEMI (non-ST elevated myocardial infarction) 12/13/2022    BNP and Creat normal.  CP with elevated Troponins.  Hx of CAD and  LAD 2018.  Cardiology consulted.  Patient's chest pain dissipated after 5 hours in the hospital.  He was taken to the cath lab and cardiac catheterization was done and stent was placed he was completely pain free following this he was watched an additional 16 hours with no return of chest pain he was cleared by Cardiology for di    PTSD (post-traumatic stress disorder)     Submucosal lesion of stomach      Past Surgical History:   Procedure Laterality Date    CARDIAC CATHETERIZATION  02/27/2023    stent placed mid circ    CORONARY ANGIOGRAPHY N/A 2/27/2023    Procedure: ANGIOGRAM, CORONARY ARTERY;  Surgeon: Miguel Angel Silverman MD;  Location: [facility] CATH LAB;  Service: Interventional;  Laterality: N/A;  radial    CORONARY ANGIOPLASTY WITH STENT PLACEMENT Right 2/27/2023    Procedure: ANGIOPLASTY, CORONARY ARTERY, WITH STENT INSERTION;  Surgeon: Miguel Angel Silverman MD;  Location: [facility] CATH LAB;  Service: Interventional;  Laterality: Right;    cyst removed from neck      skin cancer removed  01/01/2000     Social History     Socioeconomic History    Marital status:    Tobacco Use    Smoking status: Former     Current packs/day: 0.25     Average packs/day: 0.3 packs/day for 2.0 years (0.5 ttl pk-yrs)     Types: Cigarettes   Substance and Sexual Activity    Alcohol use: No    Drug use: No     Social Determinants of Health     Financial Resource Strain: Low Risk  (2/26/2023)    Overall Financial Resource Strain (CARDIA)     Difficulty of Paying Living Expenses: Not very hard   Food Insecurity: No Food Insecurity (2/26/2023)    Hunger Vital Sign     Worried About  Running Out of Food in the Last Year: Never true     Ran Out of Food in the Last Year: Never true   Transportation Needs: Unknown (2/26/2023)    PRAPARE - Transportation     Lack of Transportation (Non-Medical): No   Physical Activity: Insufficiently Active (2/26/2023)    Exercise Vital Sign     Days of Exercise per Week: 4 days     Minutes of Exercise per Session: 30 min   Stress: No Stress Concern Present (2/26/2023)    [employer] - Occupational Stress Questionnaire     Feeling of Stress : Only a little   Housing Stability: Low Risk  (2/26/2023)    Housing Stability Vital Sign     Unable to Pay for Housing in the Last Year: No     Number of Places Lived in the Last Year: 1     Unstable Housing in the Last Year: No         Allergies  Review of patient's allergies indicates:   Allergen Reactions    Atorvastatin Tinitus     Other reaction(s): Tinnitus, Insomnia         Review of Systems   Review of Systems   Constitutional: Negative for fever.   HENT:  Negative for nosebleeds.    Eyes:  Negative for visual disturbance.   Cardiovascular:  Negative for chest pain, claudication, dyspnea on exertion, palpitations and syncope.   Respiratory:  Negative for cough, hemoptysis and wheezing.    Endocrine: Negative for cold intolerance, heat intolerance, polyphagia and polyuria.   Hematologic/Lymphatic: Negative for bleeding problem.   Skin:  Negative for rash.   Musculoskeletal:  Negative for myalgias.   Gastrointestinal:  Negative for hematemesis, hematochezia, nausea and vomiting.   Genitourinary:  Negative for dysuria.   Neurological:  Negative for focal weakness and sensory change.   Psychiatric/Behavioral:  Negative for altered mental status.          Physical Exam  Wt Readings from Last 1 Encounters:   07/16/24 87.2 kg (192 lb 3.9 oz)     BP Readings from Last 3 Encounters:   07/16/24 118/70   03/25/24 126/70   01/10/24 118/68     Pulse Readings from Last 1 Encounters:   07/16/24 (!) 59      Body mass index is 31.03 kg/m².    Physical Exam  Vitals reviewed.   Constitutional:       Appearance: He is well-developed.   HENT:      Head: Atraumatic.   Eyes:      General: No scleral icterus.  Neck:      Vascular: Normal carotid pulses. No carotid bruit, hepatojugular reflux or JVD.   Cardiovascular:      Rate and Rhythm: Normal rate and regular rhythm.      Chest Wall: PMI is not displaced.      Pulses: Intact distal pulses.           Carotid pulses are 2+ on the right side and 2+ on the left side.       Radial pulses are 2+ on the right side and 2+ on the left side.        Dorsalis pedis pulses are 2+ on the right side and 2+ on the left side.      Heart sounds: Normal heart sounds, S1 normal and S2 normal. No murmur heard.     No friction rub.   Pulmonary:      Effort: Pulmonary effort is normal. No respiratory distress.      Breath sounds: Normal breath sounds. No stridor. No wheezing or rales.   Chest:      Chest wall: No tenderness.   Abdominal:      General: Bowel sounds are normal.      Palpations: Abdomen is soft.   Musculoskeletal:      Cervical back: Neck supple. No edema.   Skin:     General: Skin is warm and dry.      Nails: There is no clubbing.   Neurological:      Mental Status: He is alert and oriented to person, place, and time.   Psychiatric:         Behavior: Behavior normal.         Thought Content: Thought content normal.             Assessment  1. Atherosclerosis of native coronary artery of native heart without angina pectoris  Controlled.  Continue current treatment and monitor for anginal symptoms.    2. Primary hypertension  Well controlled.  Continue current medications and monitor    3. Hyperlipidemia LDL goal <70  At goal with LDL 62.  Continue treatment and monitor  - Lipid Panel; Future  - Comprehensive Metabolic Panel; Future        Plan and Discussion  Discussed his lab results.  Discussed his LDL is at goal at 62.  Recommend to continue with current medications, avoid  sweets.  Encourage to exercise at least 30 minutes per day, 5 days per week.      Follow Up  6 months with labs      Miguel Angel Silverman MD, F.A.C.C, F.S.C.A.I.      Total professional time spent for the encounter: 30 minutes  Time was spent preparing to see the patient, reviewing results of prior testing, obtaining and/or reviewing separately obtained history, performing a medically appropriate examination and interview, counseling and educating the patient/family, ordering medications/tests/procedures, referring and communicating with other health care professionals, documenting clinical information in the electronic health record, and independently interpreting results.    Disclaimer: This document was created using voice recognition software (M*Modal Fluency Direct). Although it may be edited, this document may contain errors related to incorrect recognition of the spoken word. Please call the physician if clarification is needed.

## 2024-12-20 RX ORDER — TRAZODONE HYDROCHLORIDE 100 MG/1
100 TABLET ORAL NIGHTLY
Qty: 90 TABLET | Refills: 1 | Status: SHIPPED | OUTPATIENT
Start: 2024-12-20

## 2024-12-20 NOTE — TELEPHONE ENCOUNTER
"Progress Note - General Surgery   William Martel 86 y.o. male MRN: [id number]  Unit/Bed#: -01 Encounter: [id number]    Assessment: 86-year-old male POD 1 repair of incarcerated right inguinal hernia  Operative findings:Huge indirect hernia with disruption of the floor of the inguinal canal, extending into the scrotum. Viable loop of small bowel in the hernia. Distal hernia imbricated around the cord and left widely open. Hernia repaired with High Ligation, Ring Tightening and Bard Soft mesh onlay.   -VSS and WNL  -WBC 13 (15)  -Hgb 14  -CMP WNL aside from glucose 176   -Loculated right pleural effusion  -A-fib, on Eliquis, LD 2/20/2024  -Shingles rash right chest/shoulder x 1 month, started on acyclovir  -acute urinary retention, Wynn in place, UOP 1L    PMHx: CAD s/p coronary stentsx3, pacemaker, bovine aortic valve replacement, Afib with flutter RVR, T9-L3 lumbar fusion    Plan:  Continue wynn catheter  Continue regular diet, IV fluids  He is scheduled for thoracentesis this afternoon  Continue to hold eliquis    Subjective/Objective   Chief Complaint: none    Subjective: He feels well. He denies pain, nausea, fever. No flatus or BM since surgery. He does feel bloated and therefore only ate a few bites of toast for breakfast though drank a couple glasses of water.     Objective:   Physical Exam:  General: VSS, NAD, alert, pleasant  Head normocephalic, atraumatic  Neck: supple, NT, no masses or JVD   Lungs nl resp effort  Heart RRR   Abd soft, no distension, NABSx4, NT, no masses or guarding. RLQ incision with glue intact, no erythema, edema, drainage. No swelling or redness of scrotum  Extremities: FAROM with good strength equal bilaterally, no edema  Neuro: A&Ox3, affect appropriate, distal sensation and muscular strength intact        Blood pressure 135/74, pulse (!) 114, temperature 97.5 °F (36.4 °C), resp. rate 16, height 5' 11\" (1.803 m), weight 91.6 kg (202 lb), SpO2 93%.,Body mass index is 28.17 " No care due was identified.  [facility] Embedded Care Due Messages. Reference number: [id number].   12/20/2024 5:41:00 PM CST   "kg/m².      Intake/Output Summary (Last 24 hours) at 2/22/2024 0721  Last data filed at 2/22/2024 0614  Gross per 24 hour   Intake 3925.83 ml   Output 1600 ml   Net 2325.83 ml       Invasive Devices       Peripheral Intravenous Line  Duration             Peripheral IV 02/21/24 Left Antecubital <1 day    Peripheral IV 02/21/24 Left Forearm <1 day              Drain  Duration             Urethral Catheter Straight-tip 16 Fr. 1 day                    Lab, Imaging and other studies:CBC:   Lab Results   Component Value Date    WBC 13.33 (H) 02/22/2024    HGB 14.0 02/22/2024    HCT 45.3 02/22/2024    MCV 89 02/22/2024     02/22/2024    RBC 5.12 02/22/2024    MCH 27.3 02/22/2024    MCHC 30.9 (L) 02/22/2024    RDW 15.6 (H) 02/22/2024    MPV 10.4 02/22/2024    NRBC 0 02/21/2024   , CMP:   Lab Results   Component Value Date    SODIUM 141 02/22/2024    K 4.7 02/22/2024     02/22/2024    CO2 32 02/22/2024    BUN 20 02/22/2024    CREATININE 1.24 02/22/2024    CALCIUM 8.9 02/22/2024    AST 9 (L) 02/22/2024    ALT 7 02/22/2024    ALKPHOS 82 02/22/2024    EGFR 51 02/22/2024   , Coagulation:   Lab Results   Component Value Date    INR 1.17 02/21/2024   , Urinalysis:   Lab Results   Component Value Date    COLORU Carlyn 02/21/2024    CLARITYU Cloudy 02/21/2024    SPECGRAV 1.020 02/21/2024    PHUR 6.0 02/21/2024    LEUKOCYTESUR Small (A) 02/21/2024    NITRITE Negative 02/21/2024    GLUCOSEU 500 (1/2%) (A) 02/21/2024    KETONESU Trace (A) 02/21/2024    BILIRUBINUR Small (A) 02/21/2024    BLOODU Large (A) 02/21/2024   , Amylase: No results found for: \"AMYLASE\", Lipase:   Lab Results   Component Value Date    LIPASE 19 02/21/2024     VTE Pharmacologic Prophylaxis: Reason for no pharmacologic prophylaxis Eliquis  VTE Mechanical Prophylaxis: sequential compression device      "

## 2024-12-21 NOTE — TELEPHONE ENCOUNTER
Refill Decision Note   Sammy Kam  is requesting a refill authorization.  Brief Assessment and Rationale for Refill:  Approve     Medication Therapy Plan:         Comments:     Note composed:7:44 PM 12/20/2024

## 2024-12-23 ENCOUNTER — TELEPHONE (OUTPATIENT)
Dept: PRIMARY CARE CLINIC | Facility: CLINIC | Age: 74
End: 2024-12-23
Payer: MEDICARE

## 2024-12-23 NOTE — TELEPHONE ENCOUNTER
----- Message from Reece sent at 12/20/2024  3:53 PM CST -----  Regarding: Rx Refill  Contact: [phone]  RX Name: traZODone (DESYREL) 100 MG tablet     How is it taken: Take 1 tablet (100 mg total) by mouth nightly as needed for Insomnia. - Oral     Quantity: 90 tablet       Preferred Pharmacy with phone number: [facility] [address] [facility] [address] [facility]  Phone: [phone]  Fax: [phone]        Ordering Provider: Dr. Rice       Contact Preference:  [phone]     Addl info: Pt hasn't slept in three days and needed the prescription sent as soon as possible

## 2025-01-15 ENCOUNTER — TELEPHONE (OUTPATIENT)
Dept: CARDIOLOGY | Facility: CLINIC | Age: 75
End: 2025-01-15
Payer: MEDICARE

## 2025-01-15 NOTE — TELEPHONE ENCOUNTER
----- Message from Wilda sent at 1/15/2025 10:29 AM CST -----  Contact: [phone]  Would like to receive medical advice.  Questions    Would they like a call back or a response via [facility]: call back     Additional information:  Sammy is calling to speak to the provider or staff on behalf of the lab appt due to chest congested and pt states he is getting better but would like to know with the chest congested can he take labs tomorrow. Sammy would also like to discuss the weather next week and the [place]. Please call Sammy back for advice

## 2025-01-15 NOTE — TELEPHONE ENCOUNTER
Spoke to pt. Rescheduled follow up with Dr. Silverman to [place] office. Notified pt okay to get labs done as scheduled. He verbalized understanding.

## 2025-01-20 ENCOUNTER — TELEPHONE (OUTPATIENT)
Dept: CARDIOLOGY | Facility: CLINIC | Age: 75
End: 2025-01-20
Payer: MEDICARE

## 2025-01-20 NOTE — TELEPHONE ENCOUNTER
Spoke to pt to reschedule 1/22/25 appt with Dr. Silverman to 2/5/25 at 11:40 pm. Pt verbalized understanding.

## 2025-01-20 NOTE — TELEPHONE ENCOUNTER
----- Message from Selina sent at 1/18/2025 12:53 PM CST -----  Regarding: Reschedule Appt  Contact: Patient  Type:  Sooner Apoointment Request    Caller is requesting a sooner appointment.  Caller declined first available appointment listed below.  Caller will not accept being placed on the waitlist and is requesting a message be sent to doctor.  Name of Caller:Patient   When is the first available appointment? 03/24/2025   Symptoms: follow up   Would the patient rather a call back or a response via [facility]? Call back   Best Call Back Number: [phone]  Additional Information: Patient is requesting a call back to reschedule appt on 01/22/2025 due to the weather please assist

## 2025-02-05 ENCOUNTER — OFFICE VISIT (OUTPATIENT)
Dept: CARDIOLOGY | Facility: CLINIC | Age: 75
End: 2025-02-05
Payer: MEDICARE

## 2025-02-05 VITALS — DIASTOLIC BLOOD PRESSURE: 60 MMHG | SYSTOLIC BLOOD PRESSURE: 122 MMHG | HEART RATE: 69 BPM | OXYGEN SATURATION: 97 %

## 2025-02-05 DIAGNOSIS — E78.5 HYPERLIPIDEMIA LDL GOAL <70: ICD-10-CM

## 2025-02-05 DIAGNOSIS — M79.671 RIGHT FOOT PAIN: ICD-10-CM

## 2025-02-05 DIAGNOSIS — I25.10 ATHEROSCLEROSIS OF NATIVE CORONARY ARTERY OF NATIVE HEART WITHOUT ANGINA PECTORIS: Primary | ICD-10-CM

## 2025-02-05 DIAGNOSIS — I10 PRIMARY HYPERTENSION: ICD-10-CM

## 2025-02-05 PROCEDURE — 99214 OFFICE O/P EST MOD 30 MIN: CPT | Mod: S$GLB,,, | Performed by: INTERNAL MEDICINE

## 2025-02-05 PROCEDURE — 1159F MED LIST DOCD IN RCRD: CPT | Mod: CPTII,S$GLB,, | Performed by: INTERNAL MEDICINE

## 2025-02-05 PROCEDURE — 99999 PR PBB SHADOW E&M-EST. PATIENT-LVL III: CPT | Mod: PBBFAC,,, | Performed by: INTERNAL MEDICINE

## 2025-02-05 PROCEDURE — 3074F SYST BP LT 130 MM HG: CPT | Mod: CPTII,S$GLB,, | Performed by: INTERNAL MEDICINE

## 2025-02-05 PROCEDURE — 93000 ELECTROCARDIOGRAM COMPLETE: CPT | Mod: S$GLB,,, | Performed by: INTERNAL MEDICINE

## 2025-02-05 PROCEDURE — 1126F AMNT PAIN NOTED NONE PRSNT: CPT | Mod: CPTII,S$GLB,, | Performed by: INTERNAL MEDICINE

## 2025-02-05 PROCEDURE — 3078F DIAST BP <80 MM HG: CPT | Mod: CPTII,S$GLB,, | Performed by: INTERNAL MEDICINE

## 2025-02-05 PROCEDURE — 1101F PT FALLS ASSESS-DOCD LE1/YR: CPT | Mod: CPTII,S$GLB,, | Performed by: INTERNAL MEDICINE

## 2025-02-05 PROCEDURE — 3288F FALL RISK ASSESSMENT DOCD: CPT | Mod: CPTII,S$GLB,, | Performed by: INTERNAL MEDICINE

## 2025-02-05 NOTE — PROGRESS NOTES
Cardiology    2/5/2025  9:30 AM    Problem list  Patient Active Problem List   Diagnosis    Sensorineural hearing loss    Tinnitus, subjective    Atherosclerosis of native coronary artery of native heart without angina pectoris    Dry eye syndrome of bilateral lacrimal glands    Primary hypertension    Hyperlipidemia LDL goal <70    Prediabetes    Radiculopathy, cervical region    History of colon polyps    Thrombocytopenia, unspecified       CC:  F/u    HPI:  He is here for a six-month follow-up.  He has no complaints.  He denies any angina, dyspnea on exertion, palpitation or syncope.  His labs shows stable lipids with LDL 61.  He is tolerating his medications.    Medications  Current Outpatient Medications   Medication Sig Dispense Refill    artificial tears,hypromellose,,GENTEAL/SUSTANE, 0.3 % Gel APPLY TO EACH EYE AT BEDTIME      aspirin (ECOTRIN) 81 MG EC tablet Take 81 mg by mouth once daily.        b complex vitamins tablet Take 1 tablet by mouth once daily.      carboxymethylcellulose (REFRESH PLUS) 0.5 % Dpet INSTILL 2 DROP(S) IN EACH EYE SIX TIMES DAILY AS NEEDED FOR DRY EYES      cholecalciferol, vitamin D3, (VITAMIN D3) 50 mcg (2,000 unit) Tab TAKE ONE TABLET BY MOUTH EVERY DAY AS A VITAMIN SUPPLEMENT      ketoconazole (NIZORAL) 2 % shampoo Apply topically twice a week.        nitroGLYCERIN (NITROSTAT) 0.4 MG SL tablet Place 1 tablet (0.4 mg total) under the tongue every 5 (five) minutes as needed for Chest pain. 25 tablet 1    rosuvastatin (CRESTOR) 40 MG Tab Take 1 tablet (40 mg total) by mouth every evening. 90 tablet 3    terbinafine HCL (LAMISIL) 1 % cream APPLY SMALL AMOUNT TOPICALLY TWICE A DAY FOR FUNGAL INFECTION APPLY TO FEET/TOES FOR FUNGAL INFECTION      traZODone (DESYREL) 100 MG tablet TAKE 1 TABLET BY MOUTH NIGHTLY AS NEEDED FOR INSOMNIA 90 tablet 1    fenofibrate 160 MG Tab Take 1 tablet (160 mg total) by mouth once daily. 90 tablet 3    metoprolol succinate (TOPROL-XL) 100 MG 24  hr tablet Take 1 tablet (100 mg total) by mouth 2 (two) times daily. 60 tablet 11     No current facility-administered medications for this visit.      Prior to Admission medications    Medication Sig Start Date End Date Taking? Authorizing Provider   artificial tears,hypromellose,,GENTEAL/SUSTANE, 0.3 % Gel APPLY TO EACH EYE AT BEDTIME 9/15/22  Yes Provider, Historical   aspirin (ECOTRIN) 81 MG EC tablet Take 81 mg by mouth once daily.     Yes Provider, Historical   b complex vitamins tablet Take 1 tablet by mouth once daily. 9/15/22  Yes Provider, Historical   carboxymethylcellulose (REFRESH PLUS) 0.5 % Dpet INSTILL 2 DROP(S) IN EACH EYE SIX TIMES DAILY AS NEEDED FOR DRY EYES 9/15/22  Yes Provider, Historical   cholecalciferol, vitamin D3, (VITAMIN D3) 50 mcg (2,000 unit) Tab TAKE ONE TABLET BY MOUTH EVERY DAY AS A VITAMIN SUPPLEMENT 9/14/22  Yes Provider, Historical   ketoconazole (NIZORAL) 2 % shampoo Apply topically twice a week.     Yes Provider, Historical   nitroGLYCERIN (NITROSTAT) 0.4 MG SL tablet Place 1 tablet (0.4 mg total) under the tongue every 5 (five) minutes as needed for Chest pain. 3/22/23  Yes Bret Rice MD   rosuvastatin (CRESTOR) 40 MG Tab Take 1 tablet (40 mg total) by mouth every evening. 3/11/24 3/6/25 Yes Bret Rice MD   terbinafine HCL (LAMISIL) 1 % cream APPLY SMALL AMOUNT TOPICALLY TWICE A DAY FOR FUNGAL INFECTION APPLY TO FEET/TOES FOR FUNGAL INFECTION 9/15/22  Yes Provider, Historical   traZODone (DESYREL) 100 MG tablet TAKE 1 TABLET BY MOUTH NIGHTLY AS NEEDED FOR INSOMNIA 12/20/24  Yes Bret Rice MD   fenofibrate 160 MG Tab Take 1 tablet (160 mg total) by mouth once daily. 10/10/23 10/9/24  Miguel Angel Silverman MD   metoprolol succinate (TOPROL-XL) 100 MG 24 hr tablet Take 1 tablet (100 mg total) by mouth 2 (two) times daily. 2/28/23 3/25/24  Ousmane Mccain II, MD         History  Past Medical History:   Diagnosis Date    Anxiety     Basal cell carcinoma      Colon polyp     Coronary artery disease     Depression     Dyslipidemia     Hypertension     NSTEMI (non-ST elevated myocardial infarction) 12/13/2022    BNP and Creat normal.  CP with elevated Troponins.  Hx of CAD and  LAD 2018.  Cardiology consulted.  Patient's chest pain dissipated after 5 hours in the hospital.  He was taken to the cath lab and cardiac catheterization was done and stent was placed he was completely pain free following this he was watched an additional 16 hours with no return of chest pain he was cleared by Cardiology for di    PTSD (post-traumatic stress disorder)     Submucosal lesion of stomach      Past Surgical History:   Procedure Laterality Date    CARDIAC CATHETERIZATION  02/27/2023    stent placed mid circ    CORONARY ANGIOGRAPHY N/A 2/27/2023    Procedure: ANGIOGRAM, CORONARY ARTERY;  Surgeon: Miguel Angel Silverman MD;  Location: [facility] CATH LAB;  Service: Interventional;  Laterality: N/A;  radial    CORONARY ANGIOPLASTY WITH STENT PLACEMENT Right 2/27/2023    Procedure: ANGIOPLASTY, CORONARY ARTERY, WITH STENT INSERTION;  Surgeon: Miguel Angel Silverman MD;  Location: [facility] CATH LAB;  Service: Interventional;  Laterality: Right;    cyst removed from neck      skin cancer removed  01/01/2000     Social History     Socioeconomic History    Marital status:    Tobacco Use    Smoking status: Former     Current packs/day: 0.25     Average packs/day: 0.3 packs/day for 2.0 years (0.5 ttl pk-yrs)     Types: Cigarettes   Substance and Sexual Activity    Alcohol use: No    Drug use: No     Social Drivers of Health     Financial Resource Strain: Low Risk  (2/26/2023)    Overall Financial Resource Strain (CARDIA)     Difficulty of Paying Living Expenses: Not very hard   Food Insecurity: No Food Insecurity (2/26/2023)    Hunger Vital Sign     Worried About Running Out of Food in the Last Year: Never true     Ran Out of Food in the Last Year: Never true   Transportation Needs: Unknown (2/26/2023)     PRAPARE - Transportation     Lack of Transportation (Non-Medical): No   Physical Activity: Insufficiently Active (2/26/2023)    Exercise Vital Sign     Days of Exercise per Week: 4 days     Minutes of Exercise per Session: 30 min   Stress: No Stress Concern Present (2/26/2023)    [employer] [place] [employer] - Occupational Stress Questionnaire     Feeling of Stress : Only a little   Housing Stability: Low Risk  (2/26/2023)    Housing Stability Vital Sign     Unable to Pay for Housing in the Last Year: No     Number of Places Lived in the Last Year: 1     Unstable Housing in the Last Year: No         Allergies  Review of patient's allergies indicates:   Allergen Reactions    Atorvastatin Tinitus     Other reaction(s): Tinnitus, Insomnia         Review of Systems   Review of Systems   Constitutional: Negative for fever.   HENT:  Negative for nosebleeds.    Eyes:  Negative for visual disturbance.   Cardiovascular:  Negative for chest pain, claudication, dyspnea on exertion, palpitations and syncope.   Respiratory:  Negative for cough, hemoptysis and wheezing.    Endocrine: Negative for cold intolerance, heat intolerance, polyphagia and polyuria.   Hematologic/Lymphatic: Negative for bleeding problem.   Skin:  Negative for rash.   Musculoskeletal:  Negative for myalgias.   Gastrointestinal:  Negative for hematemesis, hematochezia, nausea and vomiting.   Genitourinary:  Negative for dysuria.   Neurological:  Negative for focal weakness and sensory change.   Psychiatric/Behavioral:  Negative for altered mental status.          Physical Exam  Wt Readings from Last 1 Encounters:   07/16/24 87.2 kg (192 lb 3.9 oz)     BP Readings from Last 3 Encounters:   02/05/25 122/60   07/16/24 118/70   03/25/24 126/70     Pulse Readings from Last 1 Encounters:   02/05/25 69     There is no height or weight on file to calculate BMI.    Physical Exam  Vitals reviewed.   Constitutional:       Appearance: He is well-developed.    HENT:      Head: Atraumatic.   Eyes:      General: No scleral icterus.  Neck:      Vascular: Normal carotid pulses. No carotid bruit, hepatojugular reflux or JVD.   Cardiovascular:      Rate and Rhythm: Normal rate and regular rhythm.      Chest Wall: PMI is not displaced.      Pulses: Intact distal pulses.           Carotid pulses are 2+ on the right side and 2+ on the left side.       Radial pulses are 2+ on the right side and 2+ on the left side.        Dorsalis pedis pulses are 2+ on the right side and 2+ on the left side.      Heart sounds: Normal heart sounds, S1 normal and S2 normal. No murmur heard.     No friction rub.   Pulmonary:      Effort: Pulmonary effort is normal. No respiratory distress.      Breath sounds: Normal breath sounds. No stridor. No wheezing or rales.   Chest:      Chest wall: No tenderness.   Abdominal:      General: Bowel sounds are normal.      Palpations: Abdomen is soft.   Musculoskeletal:      Cervical back: Neck supple. No edema.   Skin:     General: Skin is warm and dry.      Nails: There is no clubbing.   Neurological:      Mental Status: He is alert and oriented to person, place, and time.   Psychiatric:         Behavior: Behavior normal.         Thought Content: Thought content normal.             Assessment  1. Atherosclerosis of native coronary artery of native heart without angina pectoris (Primary)  Stable.  No angina.  Continue medications and monitor    2. Primary hypertension  Well controlled.  Continue current medications and monitor    3. Hyperlipidemia LDL goal <70  LDL 61 at goal on statin.  Continue statin and monitor  - EKG 12-lead  - Comprehensive Metabolic Panel; Future  - Lipid Panel; Future    4. Right foot pain  Refer to podiatry  - Ambulatory referral/consult to Podiatry; Future        Plan and Discussion  Discussed his EKG today which shows sinus rhythm rate of 64 with voltage for LVH.  Discussed his lab results which showed excellent LDL of 61.  Continue  with current medications.  Will refer to podiatry for right foot pain.    Follow Up  Six-month with labs      Miguel Angel Silverman MD, F.A.C.C, F.S.C.A.I.      Total professional time spent for the encounter: 30 minutes  Time was spent preparing to see the patient, reviewing results of prior testing, obtaining and/or reviewing separately obtained history, performing a medically appropriate examination and interview, counseling and educating the patient/family, ordering medications/tests/procedures, referring and communicating with other health care professionals, documenting clinical information in the electronic health record, and independently interpreting results.    Disclaimer: This document was created using voice recognition software (M*Modal Fluency Direct). Although it may be edited, this document may contain errors related to incorrect recognition of the spoken word. Please call the physician if clarification is needed.

## 2025-02-17 ENCOUNTER — TELEPHONE (OUTPATIENT)
Dept: PRIMARY CARE CLINIC | Facility: CLINIC | Age: 75
End: 2025-02-17
Payer: MEDICARE

## 2025-02-17 NOTE — TELEPHONE ENCOUNTER
----- Message from Kanika sent at 2/17/2025  1:22 PM CST -----  Contact: Patient, [phone]  Requesting an RX refill or new RX.Is this a refill or new RX: RefillRX name and strength (copy/paste from chart):  metoprolol succinate (TOPROL-XL) 100 MG 24 hr tabletIs this a 30 day or 90 day RX: 90Pharmacy name and phone # (copy/paste from chart):  [facility] [address] [facility], [place] - [address] [facility] [id number] [facility] [place] [address]: [phone] Fax: [phone] The doctors have asked that we provide their patients with the following 2 reminders -- prescription refills can take up to 72 hours, and a friendly reminder that in the future you can use your [facility] account to request refills: Yes

## 2025-02-24 LAB
OHS QRS DURATION: 84 MS
OHS QTC CALCULATION: 398 MS

## 2025-02-26 RX ORDER — FENOFIBRATE 160 MG/1
160 TABLET ORAL
Qty: 90 TABLET | Refills: 3 | Status: SHIPPED | OUTPATIENT
Start: 2025-02-26

## 2025-02-26 RX ORDER — ROSUVASTATIN CALCIUM 40 MG/1
40 TABLET, COATED ORAL NIGHTLY
Qty: 90 TABLET | Refills: 0 | Status: SHIPPED | OUTPATIENT
Start: 2025-02-26

## 2025-02-26 NOTE — TELEPHONE ENCOUNTER
No care due was identified.  Health [facility] Embedded Care Due Messages. Reference number: [id number].   2/26/2025 9:40:40 AM CST

## 2025-02-26 NOTE — TELEPHONE ENCOUNTER
Refill Routing Note   Medication(s) are not appropriate for processing by [facility] for the following reason(s):        Allergy or intolerance    [facility] action(s):  Defer        Medication Therapy Plan: Allergy/Contraindication: rosuvastatin      Appointments  past 12m or future 3m with PCP    Date Provider   Last Visit   3/25/2024 Bret Rice MD   Next Visit   Visit date not found Bret Rice MD   ED visits in past 90 days: 0        Note composed:12:24 PM 02/26/2025

## 2025-05-06 ENCOUNTER — OFFICE VISIT (OUTPATIENT)
Dept: PRIMARY CARE CLINIC | Facility: CLINIC | Age: 75
End: 2025-05-06
Payer: MEDICARE

## 2025-05-06 VITALS
SYSTOLIC BLOOD PRESSURE: 114 MMHG | HEART RATE: 72 BPM | BODY MASS INDEX: 31.03 KG/M2 | RESPIRATION RATE: 18 BRPM | DIASTOLIC BLOOD PRESSURE: 80 MMHG | OXYGEN SATURATION: 99 % | TEMPERATURE: 97 F | HEIGHT: 66 IN

## 2025-05-06 DIAGNOSIS — I10 PRIMARY HYPERTENSION: ICD-10-CM

## 2025-05-06 DIAGNOSIS — Z00.00 ANNUAL PHYSICAL EXAM: Primary | ICD-10-CM

## 2025-05-06 DIAGNOSIS — E78.5 HYPERLIPIDEMIA LDL GOAL <70: ICD-10-CM

## 2025-05-06 DIAGNOSIS — R73.03 PREDIABETES: ICD-10-CM

## 2025-05-06 PROCEDURE — 3079F DIAST BP 80-89 MM HG: CPT | Mod: CPTII,S$GLB,, | Performed by: FAMILY MEDICINE

## 2025-05-06 PROCEDURE — 1160F RVW MEDS BY RX/DR IN RCRD: CPT | Mod: CPTII,S$GLB,, | Performed by: FAMILY MEDICINE

## 2025-05-06 PROCEDURE — 3074F SYST BP LT 130 MM HG: CPT | Mod: CPTII,S$GLB,, | Performed by: FAMILY MEDICINE

## 2025-05-06 PROCEDURE — 3288F FALL RISK ASSESSMENT DOCD: CPT | Mod: CPTII,S$GLB,, | Performed by: FAMILY MEDICINE

## 2025-05-06 PROCEDURE — 99999 PR PBB SHADOW E&M-EST. PATIENT-LVL IV: CPT | Mod: PBBFAC,,, | Performed by: FAMILY MEDICINE

## 2025-05-06 PROCEDURE — 1125F AMNT PAIN NOTED PAIN PRSNT: CPT | Mod: CPTII,S$GLB,, | Performed by: FAMILY MEDICINE

## 2025-05-06 PROCEDURE — 1101F PT FALLS ASSESS-DOCD LE1/YR: CPT | Mod: CPTII,S$GLB,, | Performed by: FAMILY MEDICINE

## 2025-05-06 PROCEDURE — 1159F MED LIST DOCD IN RCRD: CPT | Mod: CPTII,S$GLB,, | Performed by: FAMILY MEDICINE

## 2025-05-06 PROCEDURE — 99397 PER PM REEVAL EST PAT 65+ YR: CPT | Mod: S$GLB,,, | Performed by: FAMILY MEDICINE
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
## 2025-05-27 NOTE — TELEPHONE ENCOUNTER
No care due was identified.  Health [facility] Embedded Care Due Messages. Reference number: [id number].   5/27/2025 6:00:18 PM CDT

## 2025-05-28 RX ORDER — ROSUVASTATIN CALCIUM 40 MG/1
40 TABLET, COATED ORAL NIGHTLY
Qty: 90 TABLET | Refills: 2 | Status: SHIPPED | OUTPATIENT
Start: 2025-05-28

## 2025-05-28 NOTE — TELEPHONE ENCOUNTER
Refill Routing Note   Medication(s) are not appropriate for processing by [facility] for the following reason(s):        Allergy or intolerance    [facility] action(s):  Defer             Pharmacist review requested: Yes     Appointments  past 12m or future 3m with PCP    Date Provider   Last Visit   5/6/2025 Bret Rice MD   Next Visit   Visit date not found Bret Rice MD   ED visits in past 90 days: 0        Note composed:8:44 PM 05/27/2025

## 2025-06-19 ENCOUNTER — TELEPHONE (OUTPATIENT)
Dept: PRIMARY CARE CLINIC | Facility: CLINIC | Age: 75
End: 2025-06-19
Payer: MEDICARE

## 2025-06-19 NOTE — TELEPHONE ENCOUNTER
Called back , they are unable to give me any updates on the call. Saying they dont see that someone called

## 2025-06-19 NOTE — TELEPHONE ENCOUNTER
Copied from CRM #[id number]. Topic: General Inquiry - Patient Advice  >> Jun 19, 2025 10:35 AM Candy wrote:  Type: Returning a call    Who left a message? Ms. Ly from [facility]     When did the practice call?    Does patient know what this is regarding:    Who called and best call back number:    Would the patient rather a call back or a response via [facility]?     Comments:Ms. Ly would like to request that the doc call [facility] and update his info to them. Please call them to advise [phone]

## 2025-08-13 ENCOUNTER — OFFICE VISIT (OUTPATIENT)
Dept: CARDIOLOGY | Facility: CLINIC | Age: 75
End: 2025-08-13
Payer: MEDICARE

## 2025-08-13 VITALS
DIASTOLIC BLOOD PRESSURE: 80 MMHG | BODY MASS INDEX: 30.17 KG/M2 | HEART RATE: 81 BPM | OXYGEN SATURATION: 98 % | SYSTOLIC BLOOD PRESSURE: 132 MMHG | WEIGHT: 186.94 LBS

## 2025-08-13 DIAGNOSIS — I25.10 ATHEROSCLEROSIS OF NATIVE CORONARY ARTERY OF NATIVE HEART WITHOUT ANGINA PECTORIS: ICD-10-CM

## 2025-08-13 DIAGNOSIS — I10 PRIMARY HYPERTENSION: ICD-10-CM

## 2025-08-13 DIAGNOSIS — E78.5 HYPERLIPIDEMIA LDL GOAL <70: Primary | ICD-10-CM

## 2025-08-13 PROCEDURE — 1101F PT FALLS ASSESS-DOCD LE1/YR: CPT | Mod: CPTII,S$GLB,, | Performed by: INTERNAL MEDICINE

## 2025-08-13 PROCEDURE — 3079F DIAST BP 80-89 MM HG: CPT | Mod: CPTII,S$GLB,, | Performed by: INTERNAL MEDICINE

## 2025-08-13 PROCEDURE — 99999 PR PBB SHADOW E&M-EST. PATIENT-LVL III: CPT | Mod: PBBFAC,,, | Performed by: INTERNAL MEDICINE

## 2025-08-13 PROCEDURE — 1159F MED LIST DOCD IN RCRD: CPT | Mod: CPTII,S$GLB,, | Performed by: INTERNAL MEDICINE

## 2025-08-13 PROCEDURE — 3075F SYST BP GE 130 - 139MM HG: CPT | Mod: CPTII,S$GLB,, | Performed by: INTERNAL MEDICINE

## 2025-08-13 PROCEDURE — 99214 OFFICE O/P EST MOD 30 MIN: CPT | Mod: S$GLB,,, | Performed by: INTERNAL MEDICINE

## 2025-08-13 PROCEDURE — 3288F FALL RISK ASSESSMENT DOCD: CPT | Mod: CPTII,S$GLB,, | Performed by: INTERNAL MEDICINE

## 2025-08-13 PROCEDURE — 1126F AMNT PAIN NOTED NONE PRSNT: CPT | Mod: CPTII,S$GLB,, | Performed by: INTERNAL MEDICINE

## 2025-08-13 RX ORDER — CLOPIDOGREL BISULFATE 75 MG/1
75 TABLET ORAL DAILY
COMMUNITY